# Patient Record
Sex: MALE | URBAN - NONMETROPOLITAN AREA
[De-identification: names, ages, dates, MRNs, and addresses within clinical notes are randomized per-mention and may not be internally consistent; named-entity substitution may affect disease eponyms.]

---

## 2019-01-10 ENCOUNTER — OFFICE VISIT (OUTPATIENT)
Dept: NEUROLOGY | Age: 59
End: 2019-01-10
Payer: MEDICAID

## 2019-01-10 VITALS
HEIGHT: 68 IN | SYSTOLIC BLOOD PRESSURE: 113 MMHG | WEIGHT: 131 LBS | DIASTOLIC BLOOD PRESSURE: 77 MMHG | HEART RATE: 128 BPM | BODY MASS INDEX: 19.85 KG/M2

## 2019-01-10 DIAGNOSIS — F10.10 ALCOHOL ABUSE: ICD-10-CM

## 2019-01-10 DIAGNOSIS — G25.0 ESSENTIAL TREMOR: Primary | ICD-10-CM

## 2019-01-10 DIAGNOSIS — Z86.59 HISTORY OF ANXIETY: ICD-10-CM

## 2019-01-10 PROCEDURE — 99204 OFFICE O/P NEW MOD 45 MIN: CPT | Performed by: PSYCHIATRY & NEUROLOGY

## 2019-01-10 RX ORDER — BUSPIRONE HYDROCHLORIDE 10 MG/1
TABLET ORAL
Refills: 0 | COMMUNITY
Start: 2018-11-09

## 2019-01-10 RX ORDER — DULOXETIN HYDROCHLORIDE 60 MG/1
CAPSULE, DELAYED RELEASE ORAL
Refills: 1 | COMMUNITY
Start: 2018-12-31

## 2019-01-10 RX ORDER — TRAZODONE HYDROCHLORIDE 100 MG/1
TABLET ORAL
Refills: 5 | COMMUNITY
Start: 2018-11-21

## 2019-01-10 RX ORDER — LEVOFLOXACIN 500 MG/1
TABLET, FILM COATED ORAL
Refills: 0 | COMMUNITY
Start: 2018-11-09

## 2019-01-10 RX ORDER — ARIPIPRAZOLE 5 MG/1
TABLET ORAL
Refills: 2 | COMMUNITY
Start: 2018-11-17

## 2019-01-10 RX ORDER — ERGOCALCIFEROL 1.25 MG/1
CAPSULE ORAL
Refills: 1 | COMMUNITY
Start: 2018-12-12

## 2019-01-10 RX ORDER — RISPERIDONE 0.5 MG/1
TABLET, FILM COATED ORAL
Refills: 2 | COMMUNITY
Start: 2018-12-17

## 2019-01-10 RX ORDER — ONDANSETRON 4 MG/1
TABLET, FILM COATED ORAL
Refills: 0 | COMMUNITY
Start: 2018-11-09

## 2024-06-06 ENCOUNTER — OFFICE VISIT (OUTPATIENT)
Dept: VASCULAR SURGERY | Age: 64
End: 2024-06-06
Payer: MEDICARE

## 2024-06-06 ENCOUNTER — TELEPHONE (OUTPATIENT)
Dept: VASCULAR SURGERY | Age: 64
End: 2024-06-06

## 2024-06-06 VITALS
SYSTOLIC BLOOD PRESSURE: 147 MMHG | WEIGHT: 120 LBS | HEIGHT: 69 IN | DIASTOLIC BLOOD PRESSURE: 86 MMHG | HEART RATE: 85 BPM | TEMPERATURE: 97.3 F | BODY MASS INDEX: 17.77 KG/M2 | OXYGEN SATURATION: 85 %

## 2024-06-06 DIAGNOSIS — I70.213 ATHEROSCLER OF NATIVE ARTERY OF BOTH LEGS WITH INTERMIT CLAUDICATION (HCC): Primary | ICD-10-CM

## 2024-06-06 PROCEDURE — G8428 CUR MEDS NOT DOCUMENT: HCPCS | Performed by: NURSE PRACTITIONER

## 2024-06-06 PROCEDURE — G8419 CALC BMI OUT NRM PARAM NOF/U: HCPCS | Performed by: NURSE PRACTITIONER

## 2024-06-06 PROCEDURE — 4004F PT TOBACCO SCREEN RCVD TLK: CPT | Performed by: NURSE PRACTITIONER

## 2024-06-06 PROCEDURE — 3017F COLORECTAL CA SCREEN DOC REV: CPT | Performed by: NURSE PRACTITIONER

## 2024-06-06 PROCEDURE — 99203 OFFICE O/P NEW LOW 30 MIN: CPT | Performed by: NURSE PRACTITIONER

## 2024-06-06 NOTE — TELEPHONE ENCOUNTER
Called Dr. Borjas's office and left vm for patients med list to be faxed to us or for a return call to get that information.

## 2024-06-06 NOTE — PROGRESS NOTES
Mulugeta lBack (:  1960) is a 63 y.o. male,New patient, here for evaluation of the following chief complaint(s):  New Patient (PVD)            SUBJECTIVE/OBJECTIVE:  Mulugeta has a history of peripheral vascular disease of the lower extremities.  He has had this for < 1 year. Current treatment includes none.  Mulugeta has not had new wounds. Recently, he reports claudication at a distance of  200 feet.  Mulugeta reports that the left leg is more significant than the right.  He reports claudication is worsened and is mostly in the form of crampy type pain starting in the calves. He has a short recovery time. This is reproducible in nature. He reports ischemic rest pain 0 times per night.  He reports walking with cart does not help.    Has bilateral lower extremity edema.  Does not wear support hose.  Has not had hx of dvt    I have personally reviewed the following: problem list, current meds, allergies, PMH, PSH, family hx, and social hx  Mulugeta Black is a 63 y.o. male with the following history as recorded in [facility]:  There are no problems to display for this patient.    Current Outpatient Medications   Medication Sig Dispense Refill    ARIPiprazole (ABILIFY) 5 MG tablet TAKE 1 TABLET BY MOUTH EVERY DAY  2    busPIRone (BUSPAR) 10 MG tablet TAKE 1 TABLET BY MOUTH 3 TIMES A DAY  0    DULoxetine (CYMBALTA) 60 MG extended release capsule TAKE 1 CAPSULE BY MOUTH EVERY DAY  1    vitamin D (ERGOCALCIFEROL) 00742 units CAPS capsule TAKE ONE CAPSULE BY MOUTH WEEKLY  1    levofloxacin (LEVAQUIN) 500 MG tablet TAKE 1 TABLET BY MOUTH EVERY DAY  0    ondansetron (ZOFRAN) 4 MG tablet 1 TABLET BY MOUTH EVERY 6 HOURS AS NEEDED  0    traZODone (DESYREL) 100 MG tablet TAKE 1/2 TO 1 TABLET BY MOUTH AT BEDTIME  5    risperiDONE (RISPERDAL) 0.5 MG tablet TAKE 1 TABLET BY MOUTH TWICE A DAY  2     No current facility-administered medications for this visit.     Allergies: Patient has no known allergies.  Past Medical History:

## 2024-06-10 RX ORDER — ASPIRIN 81 MG/1
81 TABLET ORAL ONCE
OUTPATIENT
Start: 2024-06-10 | End: 2024-06-10

## 2024-06-10 RX ORDER — SODIUM CHLORIDE 0.9 % (FLUSH) 0.9 %
5-40 SYRINGE (ML) INJECTION EVERY 12 HOURS SCHEDULED
OUTPATIENT
Start: 2024-06-10

## 2024-06-10 RX ORDER — SODIUM CHLORIDE 9 MG/ML
INJECTION, SOLUTION INTRAVENOUS CONTINUOUS
OUTPATIENT
Start: 2024-06-10

## 2024-06-10 RX ORDER — CLONIDINE HYDROCHLORIDE 0.1 MG/1
0.1 TABLET ORAL PRN
OUTPATIENT
Start: 2024-06-10

## 2024-06-10 RX ORDER — SODIUM CHLORIDE 9 MG/ML
INJECTION, SOLUTION INTRAVENOUS PRN
OUTPATIENT
Start: 2024-06-10

## 2024-06-10 RX ORDER — SODIUM CHLORIDE 0.9 % (FLUSH) 0.9 %
5-40 SYRINGE (ML) INJECTION PRN
OUTPATIENT
Start: 2024-06-10

## 2024-06-10 NOTE — H&P
Mulugeta Black (:  1960) is a 63 y.o. male,New patient, here for evaluation of the following chief complaint(s):  New Patient (PVD)            SUBJECTIVE/OBJECTIVE:  Mulugeta has a history of peripheral vascular disease of the lower extremities.  He has had this for < 1 year. Current treatment includes none.  Mulugeta has not had new wounds. Recently, he reports claudication at a distance of  200 feet.  Mulugeta reports that the left leg is more significant than the right.  He reports claudication is worsened and is mostly in the form of crampy type pain starting in the calves. He has a short recovery time. This is reproducible in nature. He reports ischemic rest pain 0 times per night.  He reports walking with cart does not help.    Has bilateral lower extremity edema.  Does not wear support hose.  Has not had hx of dvt    I have personally reviewed the following: problem list, current meds, allergies, PMH, PSH, family hx, and social hx  Mulugeta Black is a 63 y.o. male with the following history as recorded in [facility]:  There are no problems to display for this patient.    Current Outpatient Medications   Medication Sig Dispense Refill    ARIPiprazole (ABILIFY) 5 MG tablet TAKE 1 TABLET BY MOUTH EVERY DAY  2    busPIRone (BUSPAR) 10 MG tablet TAKE 1 TABLET BY MOUTH 3 TIMES A DAY  0    DULoxetine (CYMBALTA) 60 MG extended release capsule TAKE 1 CAPSULE BY MOUTH EVERY DAY  1    vitamin D (ERGOCALCIFEROL) 12887 units CAPS capsule TAKE ONE CAPSULE BY MOUTH WEEKLY  1    levofloxacin (LEVAQUIN) 500 MG tablet TAKE 1 TABLET BY MOUTH EVERY DAY  0    ondansetron (ZOFRAN) 4 MG tablet 1 TABLET BY MOUTH EVERY 6 HOURS AS NEEDED  0    traZODone (DESYREL) 100 MG tablet TAKE 1/2 TO 1 TABLET BY MOUTH AT BEDTIME  5    risperiDONE (RISPERDAL) 0.5 MG tablet TAKE 1 TABLET BY MOUTH TWICE A DAY  2     No current facility-administered medications for this visit.     Allergies: Patient has no known allergies.  Past Medical History:

## 2024-06-12 ENCOUNTER — TELEPHONE (OUTPATIENT)
Dept: VASCULAR SURGERY | Age: 64
End: 2024-06-12

## 2024-06-12 NOTE — TELEPHONE ENCOUNTER
I called and spoke with the patient in regards to surgery scheduling and instructions below. Patient verbalized understanding. The patient is aware to be NPO 14 hours prior to surgery due to fasting lipid labs on arrival.       Mulugeta Black     Arteriogram Instructions     Report to the [facility] at [facility] (go in the front door and to the left) on Thursday, 06/11/2024 at 7:30 AM.  Nothing to eat or drink 14 hours prior to the procedure.  Please take all medications as normally scheduled to take unless listed below with a sip of water  If you have sleep apnea and require C-PAP, please bring this with you to the hospital.  Bring a list of all of your allergies and medications with you to the hospital.  Please let our nurse know if you have had an allergy to iodine, shellfish, or x-ray dye.  Let the nurse know if you take any of the following:  Over the counter herbal supplements  Diclofenec, indomethacin, ketoprofen, Caridopa/levadopa, naproxen, sulindac, piroxicam, glucosamine, Chondrotin, cocchine, or methotrexate.  Plan to stay at the hospital for 4 - 6 hours before being released  by the physician. You will need someone to drive you home after the procedure.  Please stop at your local [employer] or pharmacy and buy a bottle of Hibiclens. Wash thoroughly with this the night before and the morning of the procedure, paying special attention to the area that will be operated on.  Make sure you rinse very well. The Hibiclens should only be used prior to surgery.  14. Other Directions: For any questions or concerns, feel free to call [phone] ext [phone] and ask to speak to Monica.   15.  You will need a  to take you home  16.  Follow up appt: 07/25/2024 @ 9:45 AM with Olga.     Unless instructed otherwise by your physician, cleanse incision/puncture site twice daily with soap and water.  Apply dry gauze. Do not get in tub.  Okay to shower.  Do not apply any salve, cream, peroxide or

## 2024-07-01 ENCOUNTER — OFFICE VISIT (OUTPATIENT)
Dept: CARDIOLOGY CLINIC | Age: 64
End: 2024-07-01
Payer: MEDICARE

## 2024-07-01 VITALS
WEIGHT: 122 LBS | SYSTOLIC BLOOD PRESSURE: 118 MMHG | OXYGEN SATURATION: 98 % | HEART RATE: 71 BPM | HEIGHT: 69 IN | BODY MASS INDEX: 18.07 KG/M2 | DIASTOLIC BLOOD PRESSURE: 74 MMHG

## 2024-07-01 DIAGNOSIS — R60.0 BILATERAL EDEMA OF LOWER EXTREMITY: ICD-10-CM

## 2024-07-01 DIAGNOSIS — R94.31 ABNORMAL EKG: ICD-10-CM

## 2024-07-01 DIAGNOSIS — Z82.49 FAMILY HISTORY OF EARLY CAD: ICD-10-CM

## 2024-07-01 DIAGNOSIS — R06.02 SHORTNESS OF BREATH: Primary | ICD-10-CM

## 2024-07-01 DIAGNOSIS — R06.02 SHORTNESS OF BREATH: ICD-10-CM

## 2024-07-01 LAB
ANION GAP SERPL CALCULATED.3IONS-SCNC: 17 MMOL/L (ref 7–19)
BNP BLD-MCNC: 180 PG/ML (ref 0–124)
BUN SERPL-MCNC: 7 MG/DL (ref 8–23)
CALCIUM SERPL-MCNC: 9.2 MG/DL (ref 8.8–10.2)
CHLORIDE SERPL-SCNC: 99 MMOL/L (ref 98–111)
CO2 SERPL-SCNC: 22 MMOL/L (ref 22–29)
CREAT SERPL-MCNC: 0.8 MG/DL (ref 0.5–1.2)
GLUCOSE SERPL-MCNC: 93 MG/DL (ref 74–109)
POTASSIUM SERPL-SCNC: 4.1 MMOL/L (ref 3.5–5)
SODIUM SERPL-SCNC: 138 MMOL/L (ref 136–145)

## 2024-07-01 PROCEDURE — 93000 ELECTROCARDIOGRAM COMPLETE: CPT | Performed by: NURSE PRACTITIONER

## 2024-07-01 PROCEDURE — 3017F COLORECTAL CA SCREEN DOC REV: CPT | Performed by: NURSE PRACTITIONER

## 2024-07-01 PROCEDURE — 99204 OFFICE O/P NEW MOD 45 MIN: CPT | Performed by: NURSE PRACTITIONER

## 2024-07-01 PROCEDURE — 4004F PT TOBACCO SCREEN RCVD TLK: CPT | Performed by: NURSE PRACTITIONER

## 2024-07-01 PROCEDURE — G8427 DOCREV CUR MEDS BY ELIG CLIN: HCPCS | Performed by: NURSE PRACTITIONER

## 2024-07-01 PROCEDURE — G8419 CALC BMI OUT NRM PARAM NOF/U: HCPCS | Performed by: NURSE PRACTITIONER

## 2024-07-01 RX ORDER — UREA 10 %
500 LOTION (ML) TOPICAL DAILY
COMMUNITY

## 2024-07-01 RX ORDER — VITAMIN E 268 MG
400 CAPSULE ORAL DAILY
COMMUNITY

## 2024-07-01 RX ORDER — FUROSEMIDE 20 MG/1
20 TABLET ORAL DAILY PRN
Qty: 30 TABLET | Refills: 5 | Status: SHIPPED | OUTPATIENT
Start: 2024-07-01

## 2024-07-01 RX ORDER — THIAMINE MONONITRATE (VIT B1) 100 MG
100 TABLET ORAL DAILY
COMMUNITY

## 2024-07-01 ASSESSMENT — ENCOUNTER SYMPTOMS
CHEST TIGHTNESS: 0
WHEEZING: 0
COUGH: 0
SORE THROAT: 0
SHORTNESS OF BREATH: 1

## 2024-07-01 NOTE — PROGRESS NOTES
[facility]  [address]  [address]  [facility] [address]  [phone]      Chief Complaint / Reason for Being Seen: Abnormal echo    1. Shortness of breath    2. Abnormal EKG    3. Bilateral edema of lower extremity      Patient with a family history of coronary artery disease which includes a mother and father both having  of massive heart attacks.  Brother had a heart attack as well.  He is a current smoker of 50 years 1 and half pack per day.  He has seen Dr. Us in the past for COPD.  Patient with a history of peripheral vascular disease followed by vascular clinic.    Patient presents to clinic today accompanied by brother.  He does have some complaints of occasional shortness of breath he believes related to his cigarette smoking.  He denies any chest pain.  He is anticipating having vascular surgery in middle of July.  He was referred to our office by primary care provider for an abnormal 2D echo showing:     Patient had a 2D echo on 5/3/2024 at [facility] on that showed EF 55%.  Normal diastolic function.  RV size is normal.  RV systolic function normal.  Left atrium normal.  Right atrium normal.  Aortic valve sclerosis with adequate leaflet excursion with no stenosis.  Mitral valve mild mitral regurg.  Trace tricuspid regurg.  Trivial pulmonic regurg.  No pericardial effusion present.      Did discuss with patient and brother EF normal.  Normal diastolic function.  Did have some mild mitral regurg and trace tricuspid regurg trivial pulmonic regurg.  No significant valvular heart disease.        Subjective:    Old records have been obtained from the referring providers.  Those records have been reviewed and summarized.      Mulugeta Black is a 63 y.o. male with the following history as recorded in [employer]:  There are no problems to display for this patient.    Current Outpatient Medications   Medication Sig Dispense Refill    vitamin B-1 (THIAMINE) 100 MG tablet Take 1

## 2024-07-01 NOTE — PATIENT INSTRUCTIONS
[place] at the [facility] located on the first floor of [facility].   Enter through hospital main entrance and turn immediately to your left.    Patient's contact number:  [phone] (home)     Date/Time:     Dobutamine Stress Test    A chemical stress test uses chemical agents injected into the body through the vein.  These chemicals make the heart function as if it were under stress.  A chemical stress test is used when a traditional stress test (called a cardiac stress test) cannot be done.  Testing will take approximately one hour.      Dobutamine Stress Echocardiogram Instructions:   No caffeine 24 hours prior to the testing.  This includes: coffee, pop/soda, chocolate, cold medications, etc.  Any product that might contain caffeine.    Do not eat or drink anything, except water, eight (8) hours before the test.   No alcohol or nicotine twelve (12) hours prior to your test.   Wear comfortable clothing.  If you are taking metoprolol, stop morning of this procedure.     If you need to change this appointment, please call outpatient scheduling at [phone].

## 2024-07-09 ENCOUNTER — HOSPITAL ENCOUNTER (OUTPATIENT)
Age: 64
Discharge: HOME | End: 2024-07-11
Payer: MEDICARE

## 2024-07-09 VITALS
SYSTOLIC BLOOD PRESSURE: 139 MMHG | BODY MASS INDEX: 17.92 KG/M2 | DIASTOLIC BLOOD PRESSURE: 71 MMHG | WEIGHT: 121 LBS | HEIGHT: 69 IN

## 2024-07-09 DIAGNOSIS — R94.31 ABNORMAL EKG: ICD-10-CM

## 2024-07-09 LAB
ECHO BSA: 1.63 M2
STRESS BASELINE DIAS BP: 82 MMHG
STRESS BASELINE HR: 77 BPM
STRESS BASELINE SYS BP: 140 MMHG
STRESS EXERCISE DUR MIN: 7 MIN
STRESS EXERCISE DUR SEC: 54 SEC
STRESS PEAK DIAS BP: 69 MMHG
STRESS PEAK SYS BP: 157 MMHG
STRESS PERCENT HR ACHIEVED: 88 %
STRESS POST PEAK HR: 139 BPM
STRESS RATE PRESSURE PRODUCT: NORMAL BPM*MMHG
STRESS STAGE 1 BP: NORMAL MMHG
STRESS STAGE 1 DURATION: 2 MIN:SEC
STRESS STAGE 1 HR: 75 BPM
STRESS STAGE 2 BP: NORMAL MMHG
STRESS STAGE 2 DURATION: 2 MIN:SEC
STRESS STAGE 2 HR: 88 BPM
STRESS STAGE 3 BP: NORMAL MMHG
STRESS STAGE 3 DURATION: 2 MIN:SEC
STRESS STAGE 3 HR: 112 BPM
STRESS STAGE 4 BP: NORMAL MMHG
STRESS STAGE 4 DURATION: NORMAL MIN:SEC
STRESS STAGE 4 HR: 136 BPM
STRESS STAGE RECOVERY 1 DURATION: NORMAL MIN:SEC
STRESS TARGET HR: 158 BPM

## 2024-07-09 PROCEDURE — 6360000004 HC RX CONTRAST MEDICATION: Performed by: NURSE PRACTITIONER

## 2024-07-09 PROCEDURE — 93017 CV STRESS TEST TRACING ONLY: CPT

## 2024-07-09 PROCEDURE — 2500000003 HC RX 250 WO HCPCS: Performed by: NURSE PRACTITIONER

## 2024-07-09 PROCEDURE — 93016 CV STRESS TEST SUPVJ ONLY: CPT | Performed by: INTERNAL MEDICINE

## 2024-07-09 PROCEDURE — 93352 ADMIN ECG CONTRAST AGENT: CPT | Performed by: INTERNAL MEDICINE

## 2024-07-09 PROCEDURE — 93018 CV STRESS TEST I&R ONLY: CPT | Performed by: INTERNAL MEDICINE

## 2024-07-09 PROCEDURE — 93350 STRESS TTE ONLY: CPT | Performed by: INTERNAL MEDICINE

## 2024-07-09 PROCEDURE — 6360000002 HC RX W HCPCS: Performed by: NURSE PRACTITIONER

## 2024-07-09 RX ORDER — SODIUM CHLORIDE 9 MG/ML
INJECTION, SOLUTION INTRAVENOUS
Status: ACTIVE | OUTPATIENT
Start: 2024-07-09 | End: 2024-07-10

## 2024-07-09 RX ORDER — DOBUTAMINE HYDROCHLORIDE 200 MG/100ML
10 INJECTION INTRAVENOUS CONTINUOUS PRN
Status: DISPENSED | OUTPATIENT
Start: 2024-07-09 | End: 2024-07-10

## 2024-07-09 RX ORDER — ATROPINE SULFATE 0.1 MG/ML
1 INJECTION INTRAVENOUS PRN
Status: ACTIVE | OUTPATIENT
Start: 2024-07-09 | End: 2024-07-10

## 2024-07-09 RX ORDER — METOPROLOL TARTRATE 1 MG/ML
5 INJECTION, SOLUTION INTRAVENOUS PRN
Status: DISPENSED | OUTPATIENT
Start: 2024-07-09 | End: 2024-07-10

## 2024-07-09 RX ADMIN — METOPROLOL TARTRATE 5 MG: 5 INJECTION INTRAVENOUS at 11:40

## 2024-07-09 RX ADMIN — DOBUTAMINE HYDROCHLORIDE 10 MCG/KG/MIN: 200 INJECTION INTRAVENOUS at 11:32

## 2024-07-11 ENCOUNTER — HOSPITAL ENCOUNTER (OUTPATIENT)
Dept: INTERVENTIONAL RADIOLOGY/VASCULAR | Age: 64
Discharge: HOME | End: 2024-07-11
Payer: MEDICARE

## 2024-07-11 VITALS — SYSTOLIC BLOOD PRESSURE: 128 MMHG | HEART RATE: 84 BPM | DIASTOLIC BLOOD PRESSURE: 71 MMHG | OXYGEN SATURATION: 99 %

## 2024-07-11 DIAGNOSIS — I70.213 ATHEROSCLEROSIS OF NATIVE ARTERIES OF EXTREMITIES WITH INTERMITTENT CLAUDICATION, BILATERAL LEGS (HCC): ICD-10-CM

## 2024-07-11 LAB
ANION GAP SERPL CALCULATED.3IONS-SCNC: 13 MMOL/L (ref 7–19)
BUN SERPL-MCNC: 5 MG/DL (ref 8–23)
CALCIUM SERPL-MCNC: 9.4 MG/DL (ref 8.8–10.2)
CHLORIDE SERPL-SCNC: 102 MMOL/L (ref 98–111)
CHOLEST SERPL-MCNC: 118 MG/DL (ref 160–199)
CO2 SERPL-SCNC: 24 MMOL/L (ref 22–29)
CREAT SERPL-MCNC: 0.7 MG/DL (ref 0.5–1.2)
ERYTHROCYTE [DISTWIDTH] IN BLOOD BY AUTOMATED COUNT: 12.3 % (ref 11.5–14.5)
GLUCOSE SERPL-MCNC: 135 MG/DL (ref 74–109)
HCT VFR BLD AUTO: 45 % (ref 42–52)
HDLC SERPL-MCNC: 67 MG/DL (ref 55–121)
HGB BLD-MCNC: 15.1 G/DL (ref 14–18)
LDLC SERPL CALC-MCNC: 41 MG/DL
MCH RBC QN AUTO: 35 PG (ref 27–31)
MCHC RBC AUTO-ENTMCNC: 33.6 G/DL (ref 33–37)
MCV RBC AUTO: 104.2 FL (ref 80–94)
PLATELET # BLD AUTO: 221 K/UL (ref 130–400)
PMV BLD AUTO: 10.6 FL (ref 9.4–12.4)
POTASSIUM SERPL-SCNC: 3.8 MMOL/L (ref 3.5–5)
RBC # BLD AUTO: 4.32 M/UL (ref 4.7–6.1)
SODIUM SERPL-SCNC: 139 MMOL/L (ref 136–145)
TRIGL SERPL-MCNC: 49 MG/DL (ref 0–149)
WBC # BLD AUTO: 5.6 K/UL (ref 4.8–10.8)

## 2024-07-11 PROCEDURE — 36415 COLL VENOUS BLD VENIPUNCTURE: CPT

## 2024-07-11 PROCEDURE — 80048 BASIC METABOLIC PNL TOTAL CA: CPT

## 2024-07-11 PROCEDURE — 85027 COMPLETE CBC AUTOMATED: CPT

## 2024-07-11 PROCEDURE — 80061 LIPID PANEL: CPT

## 2024-07-11 RX ORDER — SODIUM CHLORIDE 0.9 % (FLUSH) 0.9 %
5-40 SYRINGE (ML) INJECTION EVERY 12 HOURS SCHEDULED
Status: DISCONTINUED | OUTPATIENT
Start: 2024-07-11 | End: 2024-07-13 | Stop reason: HOSPADM

## 2024-07-11 RX ORDER — SODIUM CHLORIDE 0.9 % (FLUSH) 0.9 %
5-40 SYRINGE (ML) INJECTION PRN
Status: DISCONTINUED | OUTPATIENT
Start: 2024-07-11 | End: 2024-07-13 | Stop reason: HOSPADM

## 2024-07-11 RX ORDER — SODIUM CHLORIDE 9 MG/ML
INJECTION, SOLUTION INTRAVENOUS PRN
Status: DISCONTINUED | OUTPATIENT
Start: 2024-07-11 | End: 2024-07-13 | Stop reason: HOSPADM

## 2024-07-11 RX ORDER — CLONIDINE HYDROCHLORIDE 0.1 MG/1
0.1 TABLET ORAL PRN
Status: DISCONTINUED | OUTPATIENT
Start: 2024-07-11 | End: 2024-07-13 | Stop reason: HOSPADM

## 2024-07-11 RX ORDER — ASPIRIN 81 MG/1
81 TABLET ORAL ONCE
Status: DISCONTINUED | OUTPATIENT
Start: 2024-07-11 | End: 2024-07-13 | Stop reason: HOSPADM

## 2024-07-11 RX ORDER — SODIUM CHLORIDE 9 MG/ML
INJECTION, SOLUTION INTRAVENOUS CONTINUOUS
Status: DISCONTINUED | OUTPATIENT
Start: 2024-07-11 | End: 2024-07-13 | Stop reason: HOSPADM

## 2024-07-11 NOTE — PROGRESS NOTES
Pt stated that he ate cheese this am and had a \"swig of soda\" at 8am today. Dr Latif notified, per Dr Latif procedure is to be rescheduled. Pt instructed that office will be in touch with him with new procedure date. Electronically signed by Jeannie Crabtree RN on 7/11/2024 at 10:09 AM

## 2024-07-15 ENCOUNTER — TELEPHONE (OUTPATIENT)
Dept: VASCULAR SURGERY | Age: 64
End: 2024-07-15

## 2024-07-15 NOTE — TELEPHONE ENCOUNTER
I called and spoke with the patient in regards surgery scheduling. The patient needed his surgery rescheduled from prior cancellation. I have provided the patient with a new surgery date. The patient is aware of his new surgery time and instructions. The patient verbalized understanding.

## 2024-07-18 ENCOUNTER — OFFICE VISIT (OUTPATIENT)
Dept: CARDIOLOGY CLINIC | Age: 64
End: 2024-07-18
Payer: MEDICARE

## 2024-07-18 ENCOUNTER — OFFICE VISIT (OUTPATIENT)
Dept: VASCULAR SURGERY | Age: 64
End: 2024-07-18
Payer: MEDICARE

## 2024-07-18 VITALS
DIASTOLIC BLOOD PRESSURE: 78 MMHG | SYSTOLIC BLOOD PRESSURE: 122 MMHG | HEART RATE: 82 BPM | WEIGHT: 122 LBS | BODY MASS INDEX: 18.02 KG/M2 | OXYGEN SATURATION: 100 %

## 2024-07-18 VITALS
TEMPERATURE: 97.9 F | OXYGEN SATURATION: 99 % | SYSTOLIC BLOOD PRESSURE: 150 MMHG | HEART RATE: 81 BPM | DIASTOLIC BLOOD PRESSURE: 73 MMHG

## 2024-07-18 DIAGNOSIS — I70.213 ATHEROSCLEROSIS OF NATIVE ARTERY OF BOTH LOWER EXTREMITIES WITH INTERMITTENT CLAUDICATION (HCC): Primary | ICD-10-CM

## 2024-07-18 DIAGNOSIS — Z82.49 FAMILY HISTORY OF EARLY CAD: ICD-10-CM

## 2024-07-18 DIAGNOSIS — I70.244 ATHEROSCLEROSIS OF NATIVE ARTERY OF LEFT LOWER EXTREMITY WITH ULCERATION OF HEEL (HCC): ICD-10-CM

## 2024-07-18 DIAGNOSIS — E78.5 DYSLIPIDEMIA: Primary | ICD-10-CM

## 2024-07-18 PROCEDURE — 99214 OFFICE O/P EST MOD 30 MIN: CPT | Performed by: NURSE PRACTITIONER

## 2024-07-18 PROCEDURE — G8419 CALC BMI OUT NRM PARAM NOF/U: HCPCS | Performed by: NURSE PRACTITIONER

## 2024-07-18 PROCEDURE — 3017F COLORECTAL CA SCREEN DOC REV: CPT | Performed by: NURSE PRACTITIONER

## 2024-07-18 PROCEDURE — 4004F PT TOBACCO SCREEN RCVD TLK: CPT | Performed by: NURSE PRACTITIONER

## 2024-07-18 PROCEDURE — G8427 DOCREV CUR MEDS BY ELIG CLIN: HCPCS | Performed by: NURSE PRACTITIONER

## 2024-07-18 RX ORDER — MUPIROCIN CALCIUM 20 MG/G
CREAM TOPICAL
Qty: 1 EACH | Refills: 0 | Status: SHIPPED | OUTPATIENT
Start: 2024-07-18 | End: 2024-08-17

## 2024-07-18 RX ORDER — SODIUM CHLORIDE 0.9 % (FLUSH) 0.9 %
5-40 SYRINGE (ML) INJECTION EVERY 12 HOURS SCHEDULED
Status: CANCELLED | OUTPATIENT
Start: 2024-07-18

## 2024-07-18 RX ORDER — ASPIRIN 81 MG/1
81 TABLET ORAL ONCE
Status: CANCELLED | OUTPATIENT
Start: 2024-07-18 | End: 2024-07-18

## 2024-07-18 RX ORDER — ATORVASTATIN CALCIUM 20 MG/1
TABLET, FILM COATED ORAL
COMMUNITY
Start: 2024-07-01

## 2024-07-18 RX ORDER — SODIUM CHLORIDE 9 MG/ML
INJECTION, SOLUTION INTRAVENOUS PRN
Status: CANCELLED | OUTPATIENT
Start: 2024-07-18

## 2024-07-18 RX ORDER — CLONIDINE HYDROCHLORIDE 0.1 MG/1
0.1 TABLET ORAL PRN
Status: CANCELLED | OUTPATIENT
Start: 2024-07-18

## 2024-07-18 RX ORDER — SODIUM CHLORIDE 0.9 % (FLUSH) 0.9 %
5-40 SYRINGE (ML) INJECTION PRN
Status: CANCELLED | OUTPATIENT
Start: 2024-07-18

## 2024-07-18 RX ORDER — HYDROGEN PEROXIDE 2.65 ML/100ML
81 LIQUID ORAL; TOPICAL DAILY
COMMUNITY
Start: 2024-07-01

## 2024-07-18 RX ORDER — SODIUM CHLORIDE 9 MG/ML
INJECTION, SOLUTION INTRAVENOUS CONTINUOUS
Status: CANCELLED | OUTPATIENT
Start: 2024-07-18

## 2024-07-18 ASSESSMENT — ENCOUNTER SYMPTOMS
CHEST TIGHTNESS: 0
WHEEZING: 0
SHORTNESS OF BREATH: 1
SORE THROAT: 0
COUGH: 0

## 2024-07-18 NOTE — H&P (VIEW-ONLY)
Mulugeta Black (:  1960) is a 63 y.o. male,New patient, here for evaluation of the following chief complaint(s):  established Patient (PVD)            SUBJECTIVE/OBJECTIVE:  Mulugeta has a history of peripheral vascular disease of the lower extremities.  He has had this for < 1 year. Current treatment includes none.  Mulugeta has not had new wounds. Recently, he reports claudication at a distance of  200 feet.  Mulugeta reports that the left leg is more significant than the right.  He reports claudication is worsened and is mostly in the form of crampy type pain starting in the calves. He has a short recovery time. This is reproducible in nature. He reports ischemic rest pain 0 times per night.  He reports walking with cart does not help. He came in today because of new wound.  Started in the last week    Has bilateral lower extremity edema.  Does not wear support hose.  Has not had hx of dvt    I have personally reviewed the following: problem list, current meds, allergies, PMH, PSH, family hx, and social hx  Mulugeta Black is a 63 y.o. male with the following history as recorded in [facility]:  Patient Active Problem List    Diagnosis Date Noted    Hyperlipidemia 2024    Atherosclerosis of native artery of both lower extremities with intermittent claudication (HCC) 2024     Current Outpatient Medications   Medication Sig Dispense Refill    atorvastatin (LIPITOR) 20 MG tablet TAKE 1 TABLET BY MOUTH ONCE DAILY WITH SUPPER FOR CHOLESTEROL      EQ ASPIRIN ADULT LOW DOSE 81 MG EC tablet Take 1 tablet by mouth daily      mupirocin (BACTROBAN) 2 % cream Apply topically 3 times daily. 1 each 0    vitamin B-1 (THIAMINE) 100 MG tablet Take 1 tablet by mouth daily      vitamin B-12 (CYANOCOBALAMIN) 500 MCG tablet Take 1 tablet by mouth daily      vitamin E 400 UNIT capsule Take 1 capsule by mouth daily      furosemide (LASIX) 20 MG tablet Take 1 tablet by mouth daily as needed (ankle swelling) 30 tablet 5     No

## 2024-07-18 NOTE — H&P
Mulugeta Black (:  1960) is a 63 y.o. male,New patient, here for evaluation of the following chief complaint(s):  established Patient (PVD)            SUBJECTIVE/OBJECTIVE:  Mulugeta has a history of peripheral vascular disease of the lower extremities.  He has had this for < 1 year. Current treatment includes none.  Mulugeta has not had new wounds. Recently, he reports claudication at a distance of  200 feet.  Mulugeta reports that the left leg is more significant than the right.  He reports claudication is worsened and is mostly in the form of crampy type pain starting in the calves. He has a short recovery time. This is reproducible in nature. He reports ischemic rest pain 0 times per night.  He reports walking with cart does not help. He came in today because of new wound.  Started in the last week    Has bilateral lower extremity edema.  Does not wear support hose.  Has not had hx of dvt    I have personally reviewed the following: problem list, current meds, allergies, PMH, PSH, family hx, and social hx  Mulugeta Black is a 63 y.o. male with the following history as recorded in [facility]:  Patient Active Problem List    Diagnosis Date Noted    Hyperlipidemia 2024    Atherosclerosis of native artery of both lower extremities with intermittent claudication (HCC) 2024     Current Outpatient Medications   Medication Sig Dispense Refill    atorvastatin (LIPITOR) 20 MG tablet TAKE 1 TABLET BY MOUTH ONCE DAILY WITH SUPPER FOR CHOLESTEROL      EQ ASPIRIN ADULT LOW DOSE 81 MG EC tablet Take 1 tablet by mouth daily      mupirocin (BACTROBAN) 2 % cream Apply topically 3 times daily. 1 each 0    vitamin B-1 (THIAMINE) 100 MG tablet Take 1 tablet by mouth daily      vitamin B-12 (CYANOCOBALAMIN) 500 MCG tablet Take 1 tablet by mouth daily      vitamin E 400 UNIT capsule Take 1 capsule by mouth daily      furosemide (LASIX) 20 MG tablet Take 1 tablet by mouth daily as needed (ankle swelling) 30 tablet 5     No

## 2024-07-18 NOTE — PROGRESS NOTES
dizziness, syncope, light-headedness and headaches.   Psychiatric/Behavioral:  Negative for confusion. The patient is not nervous/anxious.         Objective:    /78   Pulse 82   Wt 55.3 kg (122 lb)   SpO2 100%   BMI 18.02 kg/m²     GENERAL - well developed and well nourished, conversant  HEENT -  PERRLA, Hearing appears normal, gentleman lids are normal.  External inspection of ears and nose appear normal.  NECK - no thyromegaly, no JVD, trachea is in the midline  CARDIOVASCULAR - PMI is in the mid line clavicular position, Normal S1 and S2 with no systolic murmur.  No S3 or S4    PULMONARY - no respiratory distress.  No wheezes or rales. Lungs are clear to ausculation, normal respiratory effort.  ABDOMEN  - soft, non tender, no rebound  MUSCULOSKELETAL  - range of motion of the upper and lower extermites appears normal and equal and is without pain   EXTREMITIES - no significant edema   NEUROLOGIC - gait and station are normal  SKIN - turgor is normal, can warm and dry.  PSYCHIATRIC - normal mood and affect, alert and orientated x 3,      ASSESSMENT:    ALL THE CARDIOLOGY PROBLEMS ARE LISTED ABOVE; HOWEVER, THE FOLLOWING SPECIFIC CARDIAC PROBLEMS / CONDITIONS WERE ADDRESSED AND TREATED DURING THE OFFICE VISIT TODAY:                                                                                            MEDICAL DECISION MAKING             Cardiac Specific Problem / Diagnosis  Discussion and Data Reviewed Diagnostic Procedures Ordered Management Options Selected           1. Family history of early CAD  Stable   Review and summation of old records:    No chest pain.  Negative dobutamine stress echocardiogram.  Patient is on Lipitor 20 mg daily.  Aspirin 81 mg daily. No Continue current medications:     Yes           2. Shortness of breath  Stable   O2 sat in the office 100%.  Negative dobutamine stress echocardiogram.  Unremarkable 2D echo.  Did recommend smoking cessation. No Continue current

## 2024-07-18 NOTE — PROGRESS NOTES
Mulugeta Black (:  1960) is a 63 y.o. male,New patient, here for evaluation of the following chief complaint(s):  established Patient (PVD)            SUBJECTIVE/OBJECTIVE:  Mulugeta has a history of peripheral vascular disease of the lower extremities.  He has had this for < 1 year. Current treatment includes none.  Mulugeta has not had new wounds. Recently, he reports claudication at a distance of  200 feet.  Mulugeta reports that the left leg is more significant than the right.  He reports claudication is worsened and is mostly in the form of crampy type pain starting in the calves. He has a short recovery time. This is reproducible in nature. He reports ischemic rest pain 0 times per night.  He reports walking with cart does not help. He came in today because of new wound.  Started in the last week    Has bilateral lower extremity edema.  Does not wear support hose.  Has not had hx of dvt    I have personally reviewed the following: problem list, current meds, allergies, PMH, PSH, family hx, and social hx  Mulugeta Black is a 63 y.o. male with the following history as recorded in [facility]:  Patient Active Problem List    Diagnosis Date Noted    Hyperlipidemia 2024    Atherosclerosis of native artery of both lower extremities with intermittent claudication (HCC) 2024     Current Outpatient Medications   Medication Sig Dispense Refill    atorvastatin (LIPITOR) 20 MG tablet TAKE 1 TABLET BY MOUTH ONCE DAILY WITH SUPPER FOR CHOLESTEROL      EQ ASPIRIN ADULT LOW DOSE 81 MG EC tablet Take 1 tablet by mouth daily      mupirocin (BACTROBAN) 2 % cream Apply topically 3 times daily. 1 each 0    vitamin B-1 (THIAMINE) 100 MG tablet Take 1 tablet by mouth daily      vitamin B-12 (CYANOCOBALAMIN) 500 MCG tablet Take 1 tablet by mouth daily      vitamin E 400 UNIT capsule Take 1 capsule by mouth daily      furosemide (LASIX) 20 MG tablet Take 1 tablet by mouth daily as needed (ankle swelling) 30 tablet 5     No

## 2024-07-19 ENCOUNTER — HOSPITAL ENCOUNTER (OUTPATIENT)
Dept: INTERVENTIONAL RADIOLOGY/VASCULAR | Age: 64
Discharge: HOME | End: 2024-07-19
Payer: MEDICARE

## 2024-07-19 VITALS
WEIGHT: 122 LBS | BODY MASS INDEX: 18.07 KG/M2 | TEMPERATURE: 97.6 F | SYSTOLIC BLOOD PRESSURE: 107 MMHG | HEIGHT: 69 IN | RESPIRATION RATE: 17 BRPM | HEART RATE: 73 BPM | DIASTOLIC BLOOD PRESSURE: 84 MMHG | OXYGEN SATURATION: 99 %

## 2024-07-19 DIAGNOSIS — I70.213 ATHEROSCLEROSIS OF NATIVE ARTERIES OF EXTREMITIES WITH INTERMITTENT CLAUDICATION, BILATERAL LEGS (HCC): ICD-10-CM

## 2024-07-19 LAB
GLUCOSE BLD-MCNC: 96 MG/DL (ref 70–99)
PERFORMED ON: NORMAL

## 2024-07-19 PROCEDURE — 75716 ARTERY X-RAYS ARMS/LEGS: CPT

## 2024-07-19 PROCEDURE — C1887 CATHETER, GUIDING: HCPCS

## 2024-07-19 PROCEDURE — 36247 INS CATH ABD/L-EXT ART 3RD: CPT

## 2024-07-19 PROCEDURE — 99153 MOD SED SAME PHYS/QHP EA: CPT

## 2024-07-19 PROCEDURE — 99152 MOD SED SAME PHYS/QHP 5/>YRS: CPT

## 2024-07-19 PROCEDURE — 2580000003 HC RX 258: Performed by: NURSE PRACTITIONER

## 2024-07-19 PROCEDURE — 6360000002 HC RX W HCPCS: Performed by: NURSE PRACTITIONER

## 2024-07-19 PROCEDURE — 6360000002 HC RX W HCPCS: Performed by: SURGERY

## 2024-07-19 PROCEDURE — 6360000004 HC RX CONTRAST MEDICATION: Performed by: SURGERY

## 2024-07-19 PROCEDURE — 75625 CONTRAST EXAM ABDOMINL AORTA: CPT

## 2024-07-19 PROCEDURE — 6370000000 HC RX 637 (ALT 250 FOR IP): Performed by: NURSE PRACTITIONER

## 2024-07-19 PROCEDURE — 2500000003 HC RX 250 WO HCPCS: Performed by: SURGERY

## 2024-07-19 RX ORDER — SODIUM CHLORIDE 0.9 % (FLUSH) 0.9 %
5-40 SYRINGE (ML) INJECTION PRN
Status: DISCONTINUED | OUTPATIENT
Start: 2024-07-19 | End: 2024-07-21 | Stop reason: HOSPADM

## 2024-07-19 RX ORDER — SODIUM CHLORIDE 0.9 % (FLUSH) 0.9 %
5-40 SYRINGE (ML) INJECTION EVERY 12 HOURS SCHEDULED
Status: DISCONTINUED | OUTPATIENT
Start: 2024-07-19 | End: 2024-07-21 | Stop reason: HOSPADM

## 2024-07-19 RX ORDER — LIDOCAINE HYDROCHLORIDE 20 MG/ML
INJECTION, SOLUTION EPIDURAL; INFILTRATION; INTRACAUDAL; PERINEURAL PRN
Status: COMPLETED | OUTPATIENT
Start: 2024-07-19 | End: 2024-07-19

## 2024-07-19 RX ORDER — FENTANYL CITRATE 50 UG/ML
INJECTION, SOLUTION INTRAMUSCULAR; INTRAVENOUS PRN
Status: COMPLETED | OUTPATIENT
Start: 2024-07-19 | End: 2024-07-19

## 2024-07-19 RX ORDER — CLONIDINE HYDROCHLORIDE 0.1 MG/1
0.1 TABLET ORAL PRN
Status: DISCONTINUED | OUTPATIENT
Start: 2024-07-19 | End: 2024-07-21 | Stop reason: HOSPADM

## 2024-07-19 RX ORDER — MIDAZOLAM HYDROCHLORIDE 1 MG/ML
INJECTION INTRAMUSCULAR; INTRAVENOUS PRN
Status: COMPLETED | OUTPATIENT
Start: 2024-07-19 | End: 2024-07-19

## 2024-07-19 RX ORDER — HEPARIN SODIUM 5000 [USP'U]/ML
INJECTION, SOLUTION INTRAVENOUS; SUBCUTANEOUS PRN
Status: COMPLETED | OUTPATIENT
Start: 2024-07-19 | End: 2024-07-19

## 2024-07-19 RX ORDER — ASPIRIN 81 MG/1
81 TABLET ORAL ONCE
Status: COMPLETED | OUTPATIENT
Start: 2024-07-19 | End: 2024-07-19

## 2024-07-19 RX ORDER — IODIXANOL 320 MG/ML
INJECTION, SOLUTION INTRAVASCULAR PRN
Status: COMPLETED | OUTPATIENT
Start: 2024-07-19 | End: 2024-07-19

## 2024-07-19 RX ORDER — SODIUM CHLORIDE 9 MG/ML
INJECTION, SOLUTION INTRAVENOUS CONTINUOUS
Status: DISCONTINUED | OUTPATIENT
Start: 2024-07-19 | End: 2024-07-21 | Stop reason: HOSPADM

## 2024-07-19 RX ORDER — SODIUM CHLORIDE 9 MG/ML
INJECTION, SOLUTION INTRAVENOUS PRN
Status: DISCONTINUED | OUTPATIENT
Start: 2024-07-19 | End: 2024-07-21 | Stop reason: HOSPADM

## 2024-07-19 RX ADMIN — FENTANYL CITRATE 25 MCG: 50 INJECTION, SOLUTION INTRAMUSCULAR; INTRAVENOUS at 10:53

## 2024-07-19 RX ADMIN — ASPIRIN 81 MG: 81 TABLET, COATED ORAL at 10:19

## 2024-07-19 RX ADMIN — IODIXANOL 120 ML: 320 INJECTION, SOLUTION INTRAVASCULAR at 11:55

## 2024-07-19 RX ADMIN — HEPARIN SODIUM 3000 UNITS: 5000 INJECTION INTRAVENOUS; SUBCUTANEOUS at 11:05

## 2024-07-19 RX ADMIN — WATER 2000 MG: 1 INJECTION INTRAMUSCULAR; INTRAVENOUS; SUBCUTANEOUS at 10:35

## 2024-07-19 RX ADMIN — MIDAZOLAM 1 MG: 1 INJECTION INTRAMUSCULAR; INTRAVENOUS at 10:53

## 2024-07-19 RX ADMIN — SODIUM CHLORIDE: 9 INJECTION, SOLUTION INTRAVENOUS at 10:20

## 2024-07-19 RX ADMIN — LIDOCAINE HYDROCHLORIDE 10 ML: 20 INJECTION, SOLUTION EPIDURAL; INFILTRATION; INTRACAUDAL; PERINEURAL at 10:53

## 2024-07-19 RX ADMIN — HEPARIN SODIUM 5000 UNITS: 5000 INJECTION INTRAVENOUS; SUBCUTANEOUS at 10:54

## 2024-07-19 NOTE — OP NOTE
Patient Name: Mulugeta Black   YOB: 1960   MRN: [id number]     Procedure Date: 7/19/2024     Pre Op Diagnosis: PAD with left leg pain    Post Op Diagnosis: same    Procedure: Aortogram with bilateral lower extremity runoff    Anesthesia: Local with Moderate Sedation  Anesthesia: local with moderate sedation  Moderate sedation ASA class III  Timing start: ***  End time:***  Given 1 Versed and 25 Fentanyl  Vital signs were observed by separate provider that had no other duties    Estimated Blood Loss: Less than 50 ml    Complications: None    Implants: none    Procedure Details: Patient was brought to angiogram suite and placed in supine position. His bilateral groins were prepped and draped in sterile fashion. Preoperative antibiotics were given. Time out performed.  Right common femoral artery was accessed using standard  micropuncture technique. 5 Fr glide sheath was placed. J wire was advanced proximally followed by omni flush catheter. Using power injection aortogram with bilateral runoff were performed with mentioned below findings.  Patient was given 3000u of Heparin.  I decided to attempt to cross through the left SFA/above knee popliteal artery. Using omni flush catheter glide Advantage wire  was advanced to left SFA. The catheter and sheath were removed and 6 x 45 Terumo destination sheath was placed. Then Navicros was placed. Wire was changed to V18. I attempted to navigate those through the occlusion, but was not successful. Device were removed. Manual pressure held until hemostasis. Patient tolerated procedure well. He transferred back to PACU in stable condition.  Plan to schedule patient in the OR with left pedal access.    Findings: Aorta normal caliber. Bilateral renal arteries are patent. Bilateral common, internal and extrarenal iliac arteries patent.  Right common femoral arter patent, profunda patent, profunda patent, SFA, popliteal patent, 3 -vessel runoff.  Left common femoral

## 2024-07-19 NOTE — PROGRESS NOTES
Pt ambulated with RN without complication, R groin site cdi with no s/s of bleeding or hematoma after ambulation. Pt with no c/o pain. Discharge instructions reviewed with patient and patient states understanding. Patient discharged from cath holding with all belongings and AVS. Pt's brother plans to drive pt home. Electronically signed by Jeannie Crabtree RN on 7/19/2024 at 3:29 PM

## 2024-07-19 NOTE — INTERVAL H&P NOTE
Update History & Physical    The patient's History and Physical of July 18, 2024 was reviewed with the patient and I examined the patient. There was no change. The surgical site was confirmed by the patient and me.     Plan: The risks, benefits, expected outcome, and alternative to the recommended procedure have been discussed with the patient. Patient understands and wants to proceed with the procedure.     Moderate sedation  ASA III, Mallampati 3    Electronically signed by Maricarmen Latif DO on 7/19/2024 at 10:34 AM

## 2024-07-24 ENCOUNTER — HOSPITAL ENCOUNTER (OUTPATIENT)
Dept: WOUND CARE | Age: 64
Discharge: HOME | End: 2024-07-24
Payer: MEDICARE

## 2024-07-24 ENCOUNTER — PREP FOR PROCEDURE (OUTPATIENT)
Dept: VASCULAR SURGERY | Age: 64
End: 2024-07-24

## 2024-07-24 VITALS
TEMPERATURE: 96.8 F | HEART RATE: 101 BPM | RESPIRATION RATE: 18 BRPM | SYSTOLIC BLOOD PRESSURE: 134 MMHG | BODY MASS INDEX: 18.07 KG/M2 | DIASTOLIC BLOOD PRESSURE: 81 MMHG | HEIGHT: 69 IN | WEIGHT: 122 LBS

## 2024-07-24 DIAGNOSIS — Z01.818 PRE-OP TESTING: Primary | ICD-10-CM

## 2024-07-24 DIAGNOSIS — F17.200 SMOKING: ICD-10-CM

## 2024-07-24 DIAGNOSIS — L97.422 ISCHEMIC ULCER OF LEFT HEEL WITH FAT LAYER EXPOSED (HCC): ICD-10-CM

## 2024-07-24 DIAGNOSIS — I70.213 ATHEROSCLEROSIS OF NATIVE ARTERY OF BOTH LOWER EXTREMITIES WITH INTERMITTENT CLAUDICATION (HCC): Primary | ICD-10-CM

## 2024-07-24 PROCEDURE — 99215 OFFICE O/P EST HI 40 MIN: CPT | Performed by: NURSE PRACTITIONER

## 2024-07-24 PROCEDURE — 99214 OFFICE O/P EST MOD 30 MIN: CPT

## 2024-07-24 RX ORDER — LIDOCAINE HYDROCHLORIDE 20 MG/ML
JELLY TOPICAL ONCE
OUTPATIENT
Start: 2024-07-24 | End: 2024-07-24

## 2024-07-24 RX ORDER — GENTAMICIN SULFATE 1 MG/G
OINTMENT TOPICAL ONCE
Status: CANCELLED | OUTPATIENT
Start: 2024-07-24 | End: 2024-07-24

## 2024-07-24 RX ORDER — SODIUM CHLOR/HYPOCHLOROUS ACID 0.033 %
SOLUTION, IRRIGATION IRRIGATION ONCE
OUTPATIENT
Start: 2024-07-24 | End: 2024-07-24

## 2024-07-24 RX ORDER — LIDOCAINE HYDROCHLORIDE 20 MG/ML
JELLY TOPICAL ONCE
Status: CANCELLED | OUTPATIENT
Start: 2024-07-24 | End: 2024-07-24

## 2024-07-24 RX ORDER — SODIUM CHLOR/HYPOCHLOROUS ACID 0.033 %
SOLUTION, IRRIGATION IRRIGATION ONCE
Status: CANCELLED | OUTPATIENT
Start: 2024-07-24 | End: 2024-07-24

## 2024-07-24 RX ORDER — LIDOCAINE HYDROCHLORIDE 40 MG/ML
SOLUTION TOPICAL ONCE
Status: CANCELLED | OUTPATIENT
Start: 2024-07-24 | End: 2024-07-24

## 2024-07-24 RX ORDER — GINSENG 100 MG
CAPSULE ORAL ONCE
Status: CANCELLED | OUTPATIENT
Start: 2024-07-24 | End: 2024-07-24

## 2024-07-24 RX ORDER — IBUPROFEN 200 MG
TABLET ORAL ONCE
Status: CANCELLED | OUTPATIENT
Start: 2024-07-24 | End: 2024-07-24

## 2024-07-24 RX ORDER — BETAMETHASONE DIPROPIONATE 0.5 MG/G
CREAM TOPICAL ONCE
OUTPATIENT
Start: 2024-07-24 | End: 2024-07-24

## 2024-07-24 RX ORDER — CLOBETASOL PROPIONATE 0.5 MG/G
OINTMENT TOPICAL ONCE
Status: CANCELLED | OUTPATIENT
Start: 2024-07-24 | End: 2024-07-24

## 2024-07-24 RX ORDER — GENTAMICIN SULFATE 1 MG/G
OINTMENT TOPICAL ONCE
OUTPATIENT
Start: 2024-07-24 | End: 2024-07-24

## 2024-07-24 RX ORDER — LIDOCAINE 40 MG/G
CREAM TOPICAL ONCE
OUTPATIENT
Start: 2024-07-24 | End: 2024-07-24

## 2024-07-24 RX ORDER — GINSENG 100 MG
CAPSULE ORAL ONCE
OUTPATIENT
Start: 2024-07-24 | End: 2024-07-24

## 2024-07-24 RX ORDER — BACITRACIN ZINC AND POLYMYXIN B SULFATE 500; 1000 [USP'U]/G; [USP'U]/G
OINTMENT TOPICAL ONCE
Status: CANCELLED | OUTPATIENT
Start: 2024-07-24 | End: 2024-07-24

## 2024-07-24 RX ORDER — BACITRACIN ZINC AND POLYMYXIN B SULFATE 500; 1000 [USP'U]/G; [USP'U]/G
OINTMENT TOPICAL ONCE
OUTPATIENT
Start: 2024-07-24 | End: 2024-07-24

## 2024-07-24 RX ORDER — BETAMETHASONE DIPROPIONATE 0.5 MG/G
CREAM TOPICAL ONCE
Status: CANCELLED | OUTPATIENT
Start: 2024-07-24 | End: 2024-07-24

## 2024-07-24 RX ORDER — TRIAMCINOLONE ACETONIDE 1 MG/G
OINTMENT TOPICAL ONCE
OUTPATIENT
Start: 2024-07-24 | End: 2024-07-24

## 2024-07-24 RX ORDER — CLOBETASOL PROPIONATE 0.5 MG/G
OINTMENT TOPICAL ONCE
OUTPATIENT
Start: 2024-07-24 | End: 2024-07-24

## 2024-07-24 RX ORDER — IBUPROFEN 200 MG
TABLET ORAL ONCE
OUTPATIENT
Start: 2024-07-24 | End: 2024-07-24

## 2024-07-24 RX ORDER — LIDOCAINE 50 MG/G
OINTMENT TOPICAL ONCE
OUTPATIENT
Start: 2024-07-24 | End: 2024-07-24

## 2024-07-24 RX ORDER — LIDOCAINE HYDROCHLORIDE 40 MG/ML
SOLUTION TOPICAL ONCE
OUTPATIENT
Start: 2024-07-24 | End: 2024-07-24

## 2024-07-24 RX ORDER — TRIAMCINOLONE ACETONIDE 1 MG/G
OINTMENT TOPICAL ONCE
Status: CANCELLED | OUTPATIENT
Start: 2024-07-24 | End: 2024-07-24

## 2024-07-24 RX ORDER — LIDOCAINE 40 MG/G
CREAM TOPICAL ONCE
Status: CANCELLED | OUTPATIENT
Start: 2024-07-24 | End: 2024-07-24

## 2024-07-24 RX ORDER — LIDOCAINE 50 MG/G
OINTMENT TOPICAL ONCE
Status: CANCELLED | OUTPATIENT
Start: 2024-07-24 | End: 2024-07-24

## 2024-07-24 ASSESSMENT — VISUAL ACUITY: OU: 1

## 2024-07-24 NOTE — PROGRESS NOTES
Patient Care Team:  Crystal Young APRN - CNP as PCP - General (Nurse Practitioner)    TODAY'S DATE:  7/24/2024     HISTORY of PRESENTILLNESS HPI   Mulugeta Black is a 63 y.o. male who presents today for wound evaluation.  He reports he developed a wound on left foot.This started 1 week(s) ago. He believes this is  healing. He has been applying nothing. He has not had  fever orchills. He has a history of PAD.  Dr. Latif performed an arteriogram but was unable to restore blood flow.  He should be scheduled Monday for this again.  Currently the area is dry and intact.  We discussed offloading and smoking cessation at length. Follow up as needed in the future, brother will contact office immediately if wound is draining.  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 male with the following history reviewed and recorded in [facility]:    Current Outpatient Medications   Medication Sig Dispense Refill    atorvastatin (LIPITOR) 20 MG tablet TAKE 1 TABLET BY MOUTH ONCE DAILY WITH SUPPER FOR CHOLESTEROL      EQ ASPIRIN ADULT LOW DOSE 81 MG EC tablet Take 1 tablet by mouth daily      vitamin B-1 (THIAMINE) 100 MG tablet Take 1 tablet by mouth daily      vitamin B-12 (CYANOCOBALAMIN) 500 MCG tablet Take 1 tablet by mouth daily      vitamin E 400 UNIT capsule Take 1 capsule by mouth daily      furosemide (LASIX) 20 MG tablet Take 1 tablet by mouth daily as needed (ankle swelling) 30 tablet 5     No current facility-administered medications for this encounter.     Allergies: Patient has no known allergies.  Past Medical History:   Diagnosis Date

## 2024-07-24 NOTE — PLAN OF CARE
Problem: Wound:  Goal: Will show signs of wound healing; wound closure and no evidence of infection  Description: Will show signs of wound healing; wound closure and no evidence of infection  Outcome: Completed     Problem: Arterial:  Goal: Optimize blood flow for wound healing  Description: Optimize blood flow for wound healing  Outcome: Completed   Having procedure, f/u as needed

## 2024-07-24 NOTE — PATIENT INSTRUCTIONS
Okay to shower.  Do not apply any salve, cream, peroxide or alcohol to the incision.  Call with any increasing redness or drainage     [facility] follow up visit __as needed___________________________  (Please note your next appointment above and if you are unable to keep, kindly give a 24 hour notice. Thank you.)          If you experience any of the following, please call the Wound Care Center during business hours:    * Increase in Pain  * Temperature over 101  * Increase in drainage from your wound  * Drainage with a foul odor  * Bleeding  * Increase in swelling  * Need for compression bandage changes due to slippage, breakthrough drainage.    If you need medical attention outside of the business hours of the Wound Care Centers please contact your PCP or go to the nearest emergency room.

## 2024-07-24 NOTE — DISCHARGE INSTRUCTIONS
[facility] Wound Care and Hyperbaric Oxygen Therapy   Physician Orders and Discharge Instructions  [address] [place] [address] Drive  [address]  [place] [address]  Telephone: [phone]      FAX [phone]    NAME:  Mulugeta Black  YOB: 1960  MEDICAL RECORD NUMBER:  [id number]  DATE:  7/24/2024    Discharge condition: Stable    Discharge to: Home    Left via:Private automobile    Accompanied by:  sibling    ECF/HHA:     Dressing Orders:  Left foot wound: keep area dry    Treatment Orders:  Stop smoking           Surgery Directions     Report to the outpatient registration at [facility] on Monday,        07/29/2024 @ 6:00 AM.   Nothing to eat or drink after midnight the night before the procedure.  Please take all medications as normally scheduled to take unless listed below with a sip of water.  Do not take Lasix the morning of the surgery.   If you have sleep apnea and require C-PAP, please bring this with you to the hospital.  Bring a list of all of your allergies and medications with you to the hospital.  Please let our nurse know if you have had an allergy to iodine, shellfish, or x-ray dye.  Let the nurse know if you take any of the following:  Over the counter herbal supplements  Diclofenec, indomethacin, ketoprofen, Caridopa/levadopa, naproxen, sulindac, piroxicam, glucosamine, Chondrotin, cocchine, or methotrexate.  Plan to stay at the hospital for 4 - 6 hours before being released  by the physician. You will need someone to drive you home after the procedure.  Please register at the outpatient area of the [facility] on 07/25/2024 @ 10:00 AM for pre-op testing.  You will not need to be fasting prior to this appointment.   11.  You will need a  to take you home.  12.  Follow up appt: 08/12/2024 @ 8:15 AM with [facility].      Unless instructed otherwise by your physician, cleanse incision/puncture site twice daily with soap and water.  Apply dry gauze. Do not get in

## 2024-07-25 RX ORDER — SODIUM CHLORIDE 0.9 % (FLUSH) 0.9 %
5-40 SYRINGE (ML) INJECTION PRN
Status: CANCELLED | OUTPATIENT
Start: 2024-07-25

## 2024-07-25 RX ORDER — ASPIRIN 81 MG/1
81 TABLET ORAL ONCE
Status: CANCELLED | OUTPATIENT
Start: 2024-07-25 | End: 2024-07-25

## 2024-07-25 RX ORDER — SODIUM CHLORIDE 0.9 % (FLUSH) 0.9 %
5-40 SYRINGE (ML) INJECTION EVERY 12 HOURS SCHEDULED
Status: CANCELLED | OUTPATIENT
Start: 2024-07-25

## 2024-07-25 RX ORDER — SODIUM CHLORIDE 9 MG/ML
INJECTION, SOLUTION INTRAVENOUS PRN
Status: CANCELLED | OUTPATIENT
Start: 2024-07-25

## 2024-07-26 ENCOUNTER — TELEPHONE (OUTPATIENT)
Dept: VASCULAR SURGERY | Age: 64
End: 2024-07-26

## 2024-07-26 NOTE — TELEPHONE ENCOUNTER
Left a message for the patient that his surgery has been cancelled for Monday, 07/29/2024 due to insurance denial.  I instructed the patient if his foot gets any worse this weekend to go to the ER.  I have also cancelled the patient's pre-op testing for today.  I left my back office line for the patient to return my phone call with any questions.

## 2024-08-07 ENCOUNTER — TELEPHONE (OUTPATIENT)
Dept: VASCULAR SURGERY | Age: 64
End: 2024-08-07

## 2024-08-07 NOTE — TELEPHONE ENCOUNTER
Called the patient and we discussed his denial of vascular surgery.  I let him know that we can't refile the claim for 60 days.  If he has any problems he is welcome to contact our office and we will be glad to help him and can send him through the ER.  We have made the patient an appointment for 60 days to see him again to get the surgery rescheduled.  The patient is aware of the date and time of the appointment.  He voiced understanding.

## 2024-08-22 ENCOUNTER — OFFICE VISIT (OUTPATIENT)
Dept: NEUROLOGY | Age: 64
End: 2024-08-22
Payer: MEDICARE

## 2024-08-22 VITALS
HEART RATE: 88 BPM | BODY MASS INDEX: 18.07 KG/M2 | SYSTOLIC BLOOD PRESSURE: 126 MMHG | HEIGHT: 69 IN | DIASTOLIC BLOOD PRESSURE: 78 MMHG | WEIGHT: 122 LBS | OXYGEN SATURATION: 97 %

## 2024-08-22 DIAGNOSIS — R41.3 MEMORY LOSS: ICD-10-CM

## 2024-08-22 DIAGNOSIS — Z86.39 HISTORY OF HYPERLIPIDEMIA: ICD-10-CM

## 2024-08-22 DIAGNOSIS — F10.11 HISTORY OF ALCOHOL ABUSE: ICD-10-CM

## 2024-08-22 DIAGNOSIS — G25.0 ESSENTIAL TREMOR: ICD-10-CM

## 2024-08-22 DIAGNOSIS — R41.3 MEMORY LOSS: Primary | ICD-10-CM

## 2024-08-22 LAB
FOLATE SERPL-MCNC: 10 NG/ML (ref 4.5–32.2)
VIT B12 SERPL-MCNC: 608 PG/ML (ref 211–946)

## 2024-08-22 PROCEDURE — 3017F COLORECTAL CA SCREEN DOC REV: CPT | Performed by: PSYCHIATRY & NEUROLOGY

## 2024-08-22 PROCEDURE — 4004F PT TOBACCO SCREEN RCVD TLK: CPT | Performed by: PSYCHIATRY & NEUROLOGY

## 2024-08-22 PROCEDURE — G8419 CALC BMI OUT NRM PARAM NOF/U: HCPCS | Performed by: PSYCHIATRY & NEUROLOGY

## 2024-08-22 PROCEDURE — 99204 OFFICE O/P NEW MOD 45 MIN: CPT | Performed by: PSYCHIATRY & NEUROLOGY

## 2024-08-22 PROCEDURE — G8427 DOCREV CUR MEDS BY ELIG CLIN: HCPCS | Performed by: PSYCHIATRY & NEUROLOGY

## 2024-08-22 RX ORDER — MEMANTINE HYDROCHLORIDE 5 MG/1
TABLET ORAL
Qty: 120 TABLET | Refills: 0 | Status: SHIPPED | OUTPATIENT
Start: 2024-08-22 | End: 2024-08-23

## 2024-08-22 NOTE — PROGRESS NOTES
REVIEW OF SYSTEMS    Constitutional: []Fever []Sweats []Chills [] Recent Injury   [x] Denies all unless marked  HENT:[]Headache  [] Head Injury  [] Sore Throat  [] Ear Pain  [] Dizziness [] Hearing Loss   [x] Denies all unless marked  Spine:  [] Neck pain  [] Back pain  [] Sciatica  [x] Denies all unless marked  Cardiovascular:[]Chest Pain []Palpitations [] Heart Disease  [x] Denies all unless marked  Pulmonary: []Shortness of Breath []Cough   [x] Denies all unless marked  Gastrointestinal:  []Abdominal Pain  []Blood in Stool  []Diarrhea []Constipation []Nausea  []Vomiting  [x] Denies all unless marked  Genitourinary:  [] Dysuria [] Frequency  [] Incontinence [] Urgency   [x] Denies all unless marked  Musculoskeletal: [] Arthralgia  [] Myalgias [] Muscle cramps  [] Muscle twitches   [x] Denies all unless marked   Extremities:   [] Pain   [] Swelling   [x] Denies all unless marked  Skin:[] Rash  [] Color Change  [x] Denies all unless marked  Neurological:[] Visual Disturbance [] Double Vision [] Slurred Speech [] Trouble swallowing  [] Vertigo [] Tingling [] Numbness [] Weakness [] Loss of Balance   [] Loss of Consciousness [] Memory Loss [] Seizures  [x] Denies all unless marked  Psychiatric/Behavioral:[] Depression [] Anxiety  [x] Denies all unless marked  Sleep: []  Insomnia [] Sleep Disturbance [] Snoring [] Restless Legs [] Daytime Sleepiness [] Sleep Apnea  [x] Denies all unless marked    
bilaterally, no cogwheeling, normal tone    Sensory Exam  Sensation intact to light touch , PP  upper and lower extremities bilaterally; normal vibration sense in LE's    Reflexes   2+ biceps bilaterally  2+ brachioradialis  2+ triceps  2+patella  2+ ankle jerks  No clonus ankles.  No Babinski sign.    Tremors-moderate postural tremor noted    Gait  Normal base and speed  No ataxia. No Romberg sign    Coordination  Finger to nose and LULU-unremarkable    No results found for: \"YHQXCXDZ08\"  Lab Results   Component Value Date    WBC 5.6 2024    HGB 15.1 2024    HCT 45.0 2024    .2 (H) 2024     2024     Lab Results   Component Value Date     2024    K 3.8 2024     2024    CO2 24 2024    BUN 5 (L) 2024    CREATININE 0.7 2024    GLUCOSE 135 (H) 2024    CALCIUM 9.4 2024    LABGLOM >90 2024           Assessment    ICD-10-CM    1. Memory loss  R41.3 MRI BRAIN WO CONTRAST     Vitamin B12 & Folate      2. History of alcohol abuse  F10.11 MRI BRAIN WO CONTRAST     Vitamin B12 & Folate      3. History of hyperlipidemia  Z86.39 MRI BRAIN WO CONTRAST     Vitamin B12 & Folate      4. Essential tremor  G25.0         Probable underlying dementia.  Unclear how much of it is related to alcohol abuse.  Check MRI of the brain with B12 level.  Trial of Namenda recommended.  Prescription sent in.    Plan  Orders Placed This Encounter   Procedures    MRI BRAIN WO CONTRAST     Standing Status:   Future     Standing Expiration Date:   2025    Vitamin B12 & Folate     Standing Status:   Future     Number of Occurrences:   1     Standing Expiration Date:   2025         Orders Placed This Encounter   Medications    memantine (NAMENDA) 5 MG tablet     Si daily for a week, then twice daily for a week, then 1 am and 2 pm for a week, then 2 am and pm     Dispense:  120 tablet     Refill:  0       Pt warned of potential side

## 2024-08-23 ENCOUNTER — TELEPHONE (OUTPATIENT)
Dept: NEUROLOGY | Age: 64
End: 2024-08-23

## 2024-08-23 RX ORDER — MEMANTINE HYDROCHLORIDE 5 MG/1
TABLET ORAL
Qty: 60 TABLET | Refills: 0 | COMMUNITY
Start: 2024-08-23

## 2024-08-23 RX ORDER — MEMANTINE HYDROCHLORIDE 10 MG/1
10 TABLET ORAL 2 TIMES DAILY
Refills: 5 | COMMUNITY

## 2024-08-23 NOTE — TELEPHONE ENCOUNTER
Patient brother returning call to inform that patient picked up the medication Patient brother requesting a call back.

## 2024-08-23 NOTE — TELEPHONE ENCOUNTER
Medication was sent in as follows    Memantine 5mg 1 daily for a week, then twice daily for a week, then 1 am and 2 pm for a week, then 2 am and pm     Insurance will not cover that.     We called and gave a verbal at the pharmacy for Memantine 5mg twice daily and another script for Memantine 10mg twice daily.    I called the patient brother and left him a message to call us back so I could explain to him the instructions since they will not be on the bottle the first month.

## 2024-08-26 NOTE — TELEPHONE ENCOUNTER
Patient paid out of pocket for the medication so it had the instructions on the bottle. I let his brother know that the 10mg BID should be covered by insurance next month and if they have any questions they can give us a call. He gave a verbal understanding.

## 2024-09-20 ENCOUNTER — HOSPITAL ENCOUNTER (OUTPATIENT)
Dept: MRI IMAGING | Age: 64
Discharge: HOME | End: 2024-09-20
Attending: PSYCHIATRY & NEUROLOGY
Payer: MEDICARE

## 2024-09-20 DIAGNOSIS — R41.3 MEMORY LOSS: ICD-10-CM

## 2024-09-20 DIAGNOSIS — F10.11 HISTORY OF ALCOHOL ABUSE: ICD-10-CM

## 2024-09-20 DIAGNOSIS — Z86.39 HISTORY OF HYPERLIPIDEMIA: ICD-10-CM

## 2024-09-20 PROCEDURE — 70551 MRI BRAIN STEM W/O DYE: CPT

## 2024-12-06 ENCOUNTER — OFFICE VISIT (OUTPATIENT)
Dept: NEUROLOGY | Age: 64
End: 2024-12-06
Payer: MEDICARE

## 2024-12-06 VITALS
RESPIRATION RATE: 16 BRPM | DIASTOLIC BLOOD PRESSURE: 80 MMHG | HEIGHT: 69 IN | HEART RATE: 96 BPM | OXYGEN SATURATION: 98 % | WEIGHT: 122 LBS | SYSTOLIC BLOOD PRESSURE: 140 MMHG | BODY MASS INDEX: 18.07 KG/M2

## 2024-12-06 DIAGNOSIS — F10.11 HISTORY OF ALCOHOL ABUSE: ICD-10-CM

## 2024-12-06 DIAGNOSIS — R93.0 ABNORMAL MRI OF HEAD: ICD-10-CM

## 2024-12-06 DIAGNOSIS — G25.0 ESSENTIAL TREMOR: ICD-10-CM

## 2024-12-06 DIAGNOSIS — R41.3 MEMORY LOSS: Primary | ICD-10-CM

## 2024-12-06 PROCEDURE — G8427 DOCREV CUR MEDS BY ELIG CLIN: HCPCS | Performed by: PSYCHIATRY & NEUROLOGY

## 2024-12-06 PROCEDURE — 4004F PT TOBACCO SCREEN RCVD TLK: CPT | Performed by: PSYCHIATRY & NEUROLOGY

## 2024-12-06 PROCEDURE — G8419 CALC BMI OUT NRM PARAM NOF/U: HCPCS | Performed by: PSYCHIATRY & NEUROLOGY

## 2024-12-06 PROCEDURE — G8484 FLU IMMUNIZE NO ADMIN: HCPCS | Performed by: PSYCHIATRY & NEUROLOGY

## 2024-12-06 PROCEDURE — 99213 OFFICE O/P EST LOW 20 MIN: CPT | Performed by: PSYCHIATRY & NEUROLOGY

## 2024-12-06 PROCEDURE — 3017F COLORECTAL CA SCREEN DOC REV: CPT | Performed by: PSYCHIATRY & NEUROLOGY

## 2024-12-06 RX ORDER — DONEPEZIL HYDROCHLORIDE 5 MG/1
TABLET, FILM COATED ORAL
Qty: 30 TABLET | Refills: 2 | Status: SHIPPED | OUTPATIENT
Start: 2024-12-06

## 2024-12-06 NOTE — PROGRESS NOTES
REVIEW OF SYSTEMS    Constitutional: []Fever []Sweat []Chills [] Recent Injury [x] Denies all unless marked  HEENT:[]Headache  [] Head Injury/Hearing Loss  [] Sore Throat  [] Ear Ache/Dizziness  [x] Denies all unless marked  Spine:  [] Neck pain  [] Back pain  [] Sciaticia  [x] Denies all unless marked  Cardiovascular:[]Heart Disease []Chest Pain [] Palpitations  [x] Denies all unless marked  Pulmonary: []Shortness of Breath []Cough   [x] Denies all unless marke  Gastrointestinal: []Nausea  []Vomiting  []Abdominal Pain  []Constipation  []Diarrhea  []Dark Bloody Stools  [x] Denies all unless marked  Psychiatric/Behavioral:[] Depression [] Anxiety [x] Denies all unless marked  Genitourinary:   [] Frequency  [] Urgency  [] Incontinence [] Pain with Urination  [x] Denies all unless marked  Extremities: []Pain  []Swelling  [x] Denies all unless marked  Musculoskeletal: [] Muscle Pain  [] Joint Pain  [] Arthritis [] Muscle Cramps [x] Muscle Twitches  [x] Denies all unless marked  Sleep: [] Insomnia [] Snoring [] Restless Legs [] Sleep Apnea  [] Daytime Sleepiness  [x] Denies all unless marked  Skin:[] Rash [] Skin Discoloration [x] Denies all unless marked   Neurological: []Visual Disturbance/Memory Loss [] Loss of Balance [] Slurred Speech/Weakness [] Seizures  [] Vertigo/Dizziness [x] Denies all unless marked

## 2024-12-06 NOTE — PROGRESS NOTES
Chief Complaint   Patient presents with    Memory Loss     Patient states tremors and memory loss is about the same       Mulugeta Black is a 64 y.o. year old male who is seen for evaluation of dementia.  He is here today with his brother.  He states he has been on disability for several years for his dementia.  He has a past history of heavy alcohol abuse.  The patient currently lives by himself and does his own ADLs but does not drive.    Is unclear if he has been on any medication for dementia in the past or what his workup may have been..  He appears to have some word finding difficulties at times.  Last seen here 8/24.  Namenda was added.  Doing about the same.  B12 level came back normal.  MRI showing prominent diffuse atrophy with minimal white matter change and encephalomalacia in the right parietal lobe.  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 Latif       Family History   Problem Relation Age of Onset    Heart Disease Mother     Heart Disease Father     Heart Attack Brother        No Known Allergies    Social History     Socioeconomic History    Marital status: Single     Spouse name: Not on file    Number of children: Not on file    Years of education: Not on file    Highest education level: Not on file   Occupational History    Not on file   Tobacco Use    Smoking status: Every Day     Current packs/day: 2.00     Types:

## 2024-12-11 ENCOUNTER — HOSPITAL ENCOUNTER (OUTPATIENT)
Dept: VASCULAR LAB | Age: 64
Discharge: HOME | End: 2024-12-13
Payer: MEDICARE

## 2024-12-11 ENCOUNTER — OFFICE VISIT (OUTPATIENT)
Dept: VASCULAR SURGERY | Age: 64
End: 2024-12-11
Payer: MEDICARE

## 2024-12-11 VITALS — DIASTOLIC BLOOD PRESSURE: 84 MMHG | OXYGEN SATURATION: 100 % | SYSTOLIC BLOOD PRESSURE: 138 MMHG | HEART RATE: 82 BPM

## 2024-12-11 DIAGNOSIS — I70.213 ATHEROSCLER OF NATIVE ARTERY OF BOTH LEGS WITH INTERMIT CLAUDICATION (HCC): ICD-10-CM

## 2024-12-11 DIAGNOSIS — I70.213 ATHEROSCLER OF NATIVE ARTERY OF BOTH LEGS WITH INTERMIT CLAUDICATION (HCC): Primary | ICD-10-CM

## 2024-12-11 PROCEDURE — 4004F PT TOBACCO SCREEN RCVD TLK: CPT | Performed by: NURSE PRACTITIONER

## 2024-12-11 PROCEDURE — 93923 UPR/LXTR ART STDY 3+ LVLS: CPT

## 2024-12-11 PROCEDURE — G8419 CALC BMI OUT NRM PARAM NOF/U: HCPCS | Performed by: NURSE PRACTITIONER

## 2024-12-11 PROCEDURE — 3017F COLORECTAL CA SCREEN DOC REV: CPT | Performed by: NURSE PRACTITIONER

## 2024-12-11 PROCEDURE — G8428 CUR MEDS NOT DOCUMENT: HCPCS | Performed by: NURSE PRACTITIONER

## 2024-12-11 PROCEDURE — G8484 FLU IMMUNIZE NO ADMIN: HCPCS | Performed by: NURSE PRACTITIONER

## 2024-12-11 PROCEDURE — 99214 OFFICE O/P EST MOD 30 MIN: CPT | Performed by: NURSE PRACTITIONER

## 2024-12-11 NOTE — PROGRESS NOTES
Take 1 tablet by mouth daily as needed (ankle swelling) 30 tablet 5     No current facility-administered medications for this visit.     Allergies: Patient has no known allergies.  Past Medical History:   Diagnosis Date    Acute ulcer of stomach     Atherosclerosis of native artery of both lower extremities with intermittent claudication (HCC)     Hyperlipidemia      Past Surgical History:   Procedure Laterality Date    COLONOSCOPY      NECK SURGERY      VASCULAR SURGERY Right 07/19/2024    Aortogram with bilateral lower extremity runoff by Dr. Latif     Family History   Problem Relation Age of Onset    Heart Disease Mother     Heart Disease Father     Heart Attack Brother      Social History     Tobacco Use    Smoking status: Every Day     Current packs/day: 2.00     Types: Cigarettes     Passive exposure: Current    Smokeless tobacco: Never   Substance Use Topics    Alcohol use: Yes     Comment: beer         ROS  Eyes - no sudden vision change or amaurosis.  Respiratory - no significant shortness of breath,  Cardiovascular - no chest pain or syncope.  No  significant leg swelling.  has claudication.  Musculoskeletal - no gait disturbance  Skin - no new wound.  Neurologic -  No speech difficulty or lateralizing weakness.  All other review of systems are negative.    Physical Exam    /84 (Site: Left Upper Arm, Position: Sitting, Cuff Size: Medium Adult)   Pulse 82   SpO2 100%       Neck- carotid pulses 2+ to palpation with no bruit  Cardiovascular - Regular rate and rhythm.    Pulmonary - effort appears normal.  No respiratory distress.    Lungs - Breath sounds normal. No wheezes or rales.    Extremities -  - Radial and brachial pulses are 2+ to palpation bilaterally.  Right femoral pulse: present 2+; Right popliteal pulse: absent Right DP: absent; Right PT absent; Left femoral pulse: present 2+; Left popliteal pulse: absent; Left DP: absent; Left PT: absent No cyanosis, clubbing, or significant edema.

## 2024-12-12 LAB
VAS LEFT ABI: 0.54
VAS LEFT ANKLE BP: 75 MMHG
VAS LEFT ARM BP: 135 MMHG
VAS LEFT CALF PRESSURE: 84 MMHG
VAS LEFT DORSALIS PEDIS BP: 75 MMHG
VAS LEFT HIGH THIGH PRESSURE: 103 MMHG
VAS LEFT LOW THIGH PRESSURE: 89 MMHG
VAS LEFT PTA BP: 69 MMHG
VAS LEFT TBI: 0.1
VAS LEFT TOE PRESSURE: 14 MMHG
VAS RIGHT ABI: 0.91
VAS RIGHT ANKLE BP: 126 MMHG
VAS RIGHT ARM BP: 138 MMHG
VAS RIGHT CALF PRESSURE: 133 MMHG
VAS RIGHT DORSALIS PEDIS BP: 126 MMHG
VAS RIGHT HIGH THIGH PRESSURE: 150 MMHG
VAS RIGHT LOW THIGH PRESSURE: 129 MMHG
VAS RIGHT PTA BP: 122 MMHG
VAS RIGHT TBI: 0.39
VAS RIGHT TOE PRESSURE: 54 MMHG

## 2024-12-30 ENCOUNTER — PREP FOR PROCEDURE (OUTPATIENT)
Dept: VASCULAR SURGERY | Age: 64
End: 2024-12-30

## 2024-12-30 ENCOUNTER — TELEPHONE (OUTPATIENT)
Dept: VASCULAR SURGERY | Age: 64
End: 2024-12-30

## 2024-12-30 DIAGNOSIS — Z01.818 PRE-OP TESTING: Primary | ICD-10-CM

## 2024-12-30 DIAGNOSIS — I70.213 ATHEROSCLER OF NATIVE ARTERY OF BOTH LEGS WITH INTERMIT CLAUDICATION (HCC): ICD-10-CM

## 2024-12-30 NOTE — TELEPHONE ENCOUNTER
Called and left a message for the patient to go over his surgery instructions.            Mulugeta Sofia    Surgery Directions    Report to the outpatient registration at [facility] on Monday,        02/03/2025.  The surgery department will contact you after 2:00 PM on        Friday to let you know what time to arrive @ the hospital for surgery.    Nothing to eat or drink after midnight the night before the procedure.  Please take all medications as normally scheduled to take unless listed below with a sip of water.  Do not take Laix the morning of the procedure.   If you have sleep apnea and require C-PAP, please bring this with you to the hospital.  Bring a list of all of your allergies and medications with you to the hospital.  Please let our nurse know if you have had an allergy to iodine, shellfish, or x-ray dye.  Let the nurse know if you take any of the following:  Over the counter herbal supplements  Diclofenec, indomethacin, ketoprofen, Caridopa/levadopa, naproxen, sulindac, piroxicam, glucosamine, Chondrotin, cocchine, or methotrexate.  Plan to stay at the hospital for 4 - 6 hours before being released  by the physician. You will need someone to drive you home after the procedure.  Please register at the outpatient area of the [facility] on 01/28/2025 @ 1:00 PM for pre-op testing.  You will not need to be fasting prior to this appointment.  Following this appointment please register in [facility]'s office for a chart review prior to surgery.    11. Other Directions: For any questions or concerns please contact our office @        [phone] and ask to speak to Crystal.   12.  You will need a  to take you home.  13.  Follow up appt: 02/25/2025 @ 8:00 AM in the vascular lab  for vascular          Testing.  Following this appointment please register in [facility]'s office.     Unless instructed otherwise by your physician, cleanse incision/puncture site twice daily with soap and water.  Apply

## 2025-01-02 NOTE — TELEPHONE ENCOUNTER
The patient returned my phone call and we discussed his surgery instructions.  The patient voiced understanding.

## 2025-01-28 ENCOUNTER — HOSPITAL ENCOUNTER (OUTPATIENT)
Dept: GENERAL RADIOLOGY | Age: 65
Discharge: HOME | End: 2025-01-28
Payer: MEDICARE

## 2025-01-28 ENCOUNTER — OFFICE VISIT (OUTPATIENT)
Dept: VASCULAR SURGERY | Age: 65
End: 2025-01-28
Payer: MEDICARE

## 2025-01-28 VITALS
DIASTOLIC BLOOD PRESSURE: 72 MMHG | SYSTOLIC BLOOD PRESSURE: 130 MMHG | HEART RATE: 98 BPM | TEMPERATURE: 98.2 F | OXYGEN SATURATION: 99 %

## 2025-01-28 DIAGNOSIS — I70.213 ATHEROSCLER OF NATIVE ARTERY OF BOTH LEGS WITH INTERMIT CLAUDICATION (HCC): Primary | ICD-10-CM

## 2025-01-28 PROCEDURE — 3017F COLORECTAL CA SCREEN DOC REV: CPT | Performed by: NURSE PRACTITIONER

## 2025-01-28 PROCEDURE — 99214 OFFICE O/P EST MOD 30 MIN: CPT | Performed by: NURSE PRACTITIONER

## 2025-01-28 PROCEDURE — 4004F PT TOBACCO SCREEN RCVD TLK: CPT | Performed by: NURSE PRACTITIONER

## 2025-01-28 PROCEDURE — G8427 DOCREV CUR MEDS BY ELIG CLIN: HCPCS | Performed by: NURSE PRACTITIONER

## 2025-01-28 PROCEDURE — 71046 X-RAY EXAM CHEST 2 VIEWS: CPT

## 2025-01-28 PROCEDURE — G8419 CALC BMI OUT NRM PARAM NOF/U: HCPCS | Performed by: NURSE PRACTITIONER

## 2025-01-29 RX ORDER — SODIUM CHLORIDE 9 MG/ML
INJECTION, SOLUTION INTRAVENOUS PRN
OUTPATIENT
Start: 2025-01-29

## 2025-01-29 RX ORDER — SODIUM CHLORIDE 0.9 % (FLUSH) 0.9 %
5-40 SYRINGE (ML) INJECTION EVERY 12 HOURS SCHEDULED
OUTPATIENT
Start: 2025-01-29

## 2025-01-29 RX ORDER — ASPIRIN 81 MG/1
81 TABLET ORAL ONCE
OUTPATIENT
Start: 2025-01-29 | End: 2025-01-29

## 2025-01-29 RX ORDER — SODIUM CHLORIDE 0.9 % (FLUSH) 0.9 %
5-40 SYRINGE (ML) INJECTION PRN
OUTPATIENT
Start: 2025-01-29

## 2025-01-29 NOTE — H&P (VIEW-ONLY)
Mulugeta Black (:  1960) is a 64 y.o. male,Established patient, here for evaluation of the following chief complaint(s):  Follow-up (Chart review prior to surgery. )            SUBJECTIVE/OBJECTIVE:  Mulugeta has a history of peripheral vascular disease of the lower extremities.  He has had this for 1 - 5 years. Current treatment includes asa and lipitor.  Mulugeta has not had new wounds. Recently, he reports claudication at a distance of  100 feet.  Mulugeta reports that the left leg is more significant than the right.  He reports claudication is worsened and is mostly in the form of crampy type pain starting in the calves. He has a short recovery time. This is reproducible in nature. He reports ischemic rest pain at night.  He reports walking with cart does not help. He has tried an exercise routine and this has not helped.  He is ready to proceed    I have personally reviewed the following: problem list, current meds, allergies, PMH, PSH, family hx, and social hx  Mulugeta Black is a 64 y.o. 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) 2024    Hyperlipidemia 2024    Atherosclerosis of native artery of both lower extremities with intermittent claudication (HCC) 2024     Current Outpatient Medications   Medication Sig Dispense Refill    Multiple Vitamins-Minerals (THERAPEUTIC MULTIVITAMIN-MINERALS) tablet Take 1 tablet by mouth daily      memantine (NAMENDA) 10 MG tablet Take 1 tablet by mouth 2 times daily  5    atorvastatin (LIPITOR) 20 MG tablet Take 1 tablet by mouth daily      EQ ASPIRIN ADULT LOW DOSE 81 MG EC tablet Take 1 tablet by mouth daily      furosemide (LASIX) 20 MG tablet Take 1 tablet by mouth daily as needed (ankle swelling) 30 tablet 5     No current facility-administered medications for this visit.     Allergies: Patient has no  Right PT absent; Left femoral pulse: present 2+; Left popliteal pulse: absent; Left DP: absent; Left PT: absent No cyanosis, clubbing, or significant edema.  No signs atheroembolic event.  Neurologic - alert and oriented X 3.  Physiologic.   Face symmetric.  Skin - warm, dry, and intact.  no wound  Psychiatric - mood, affect, and behavior appear normal.  Judgment and thought processes appear normal.    Risk factors for atherosclerosis of all vascular beds have been reviewed with the patient including:  Family history, tobacco abuse in all forms, elevated cholesterol, hyperlipidemia, and diabetes.    Lower extremity arterial study: Right CHE 0.91, Left CHE 0.54.  Individual films reviewed: Yes.  These results were reviewed with the patient.  Disease process is chronic illness with exacerbation, progression, or side effects of treatment  by review of waveforms, I see moderate progression of disease    Angio - Aorta normal caliber. Bilateral renal arteries are patent. Bilateral common, internal and extrarenal iliac arteries patent.  Right common femoral arter patent, profunda patent, profunda patent, SFA, popliteal patent, 3 -vessel runoff.  Left common femoral patent, profunda patent, SFA occluded at the mid portion with collateralized flow to infrageniculatepopliteal artery. The rest of the popliteal artery patent with 3-vessel runoff   Reviewed previous studies including: favian  Individual images were reviewed.  I agree with the findings  Results were discussed with the patient.    Options have been discussed with the patient including continued medical management vs. proceeding with intraoperative angiogram with runoff and possible angioplasty/atherectomy/stent  With pedal access.  Patient has opted to proceed with this.  Risks have been discussed with the patient including but not limited to MI, death, CVA, bleed, nerve injury, and infection.  He has asked appropriate questions    ASSESSMENT/PLAN:  1. Atheroscler of

## 2025-01-29 NOTE — PROGRESS NOTES
Mulugeta Black (:  1960) is a 64 y.o. male,Established patient, here for evaluation of the following chief complaint(s):  Follow-up (Chart review prior to surgery. )            SUBJECTIVE/OBJECTIVE:  Mulugeta has a history of peripheral vascular disease of the lower extremities.  He has had this for 1 - 5 years. Current treatment includes asa and lipitor.  Mulugeta has not had new wounds. Recently, he reports claudication at a distance of  100 feet.  Mulugeta reports that the left leg is more significant than the right.  He reports claudication is worsened and is mostly in the form of crampy type pain starting in the calves. He has a short recovery time. This is reproducible in nature. He reports ischemic rest pain at night.  He reports walking with cart does not help. He has tried an exercise routine and this has not helped.  He is ready to proceed    I have personally reviewed the following: problem list, current meds, allergies, PMH, PSH, family hx, and social hx  Mulugeta Black is a 64 y.o. 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 (HCC) 2024    Hyperlipidemia 2024    Atherosclerosis of native artery of both lower extremities with intermittent claudication (HCC) 2024     Current Outpatient Medications   Medication Sig Dispense Refill    Multiple Vitamins-Minerals (THERAPEUTIC MULTIVITAMIN-MINERALS) tablet Take 1 tablet by mouth daily      memantine (NAMENDA) 10 MG tablet Take 1 tablet by mouth 2 times daily  5    atorvastatin (LIPITOR) 20 MG tablet Take 1 tablet by mouth daily      EQ ASPIRIN ADULT LOW DOSE 81 MG EC tablet Take 1 tablet by mouth daily      furosemide (LASIX) 20 MG tablet Take 1 tablet by mouth daily as needed (ankle swelling) 30 tablet 5     No current facility-administered medications for this visit.     Allergies: Patient has no

## 2025-01-29 NOTE — H&P
Mulugeta Black (:  1960) is a 64 y.o. male,Established patient, here for evaluation of the following chief complaint(s):  Follow-up (Chart review prior to surgery. )            SUBJECTIVE/OBJECTIVE:  Mulugeta has a history of peripheral vascular disease of the lower extremities.  He has had this for 1 - 5 years. Current treatment includes asa and lipitor.  Mulugeta has not had new wounds. Recently, he reports claudication at a distance of  100 feet.  Mulugeta reports that the left leg is more significant than the right.  He reports claudication is worsened and is mostly in the form of crampy type pain starting in the calves. He has a short recovery time. This is reproducible in nature. He reports ischemic rest pain at night.  He reports walking with cart does not help. He has tried an exercise routine and this has not helped.  He is ready to proceed    I have personally reviewed the following: problem list, current meds, allergies, PMH, PSH, family hx, and social hx  Mulugeta Black is a 64 y.o. 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 (HCC) 2024    Hyperlipidemia 2024    Atherosclerosis of native artery of both lower extremities with intermittent claudication (HCC) 2024     Current Outpatient Medications   Medication Sig Dispense Refill    Multiple Vitamins-Minerals (THERAPEUTIC MULTIVITAMIN-MINERALS) tablet Take 1 tablet by mouth daily      memantine (NAMENDA) 10 MG tablet Take 1 tablet by mouth 2 times daily  5    atorvastatin (LIPITOR) 20 MG tablet Take 1 tablet by mouth daily      EQ ASPIRIN ADULT LOW DOSE 81 MG EC tablet Take 1 tablet by mouth daily      furosemide (LASIX) 20 MG tablet Take 1 tablet by mouth daily as needed (ankle swelling) 30 tablet 5     No current facility-administered medications for this visit.     Allergies: Patient has no

## 2025-02-05 ENCOUNTER — TELEPHONE (OUTPATIENT)
Dept: VASCULAR SURGERY | Age: 65
End: 2025-02-05

## 2025-02-05 NOTE — TELEPHONE ENCOUNTER
Tried calling the patient to let him know that his surgical case has been changed from tomorrow, Thursday, 02/06/2025 to Friday, 02/07/2025.  Left a message to let him know this and ask him to return my phone call to confirm.

## 2025-02-07 ENCOUNTER — APPOINTMENT (OUTPATIENT)
Dept: INTERVENTIONAL RADIOLOGY/VASCULAR | Age: 65
End: 2025-02-07
Attending: SURGERY
Payer: MEDICARE

## 2025-02-07 ENCOUNTER — HOSPITAL ENCOUNTER (OUTPATIENT)
Age: 65
Setting detail: OUTPATIENT SURGERY
Discharge: HOME | End: 2025-02-07
Attending: SURGERY | Admitting: SURGERY
Payer: MEDICARE

## 2025-02-07 ENCOUNTER — ANESTHESIA (OUTPATIENT)
Dept: OPERATING ROOM | Age: 65
End: 2025-02-07
Payer: MEDICARE

## 2025-02-07 ENCOUNTER — ANESTHESIA EVENT (OUTPATIENT)
Dept: OPERATING ROOM | Age: 65
End: 2025-02-07
Payer: MEDICARE

## 2025-02-07 VITALS
DIASTOLIC BLOOD PRESSURE: 77 MMHG | OXYGEN SATURATION: 100 % | WEIGHT: 128 LBS | RESPIRATION RATE: 16 BRPM | SYSTOLIC BLOOD PRESSURE: 127 MMHG | HEART RATE: 72 BPM | BODY MASS INDEX: 18.32 KG/M2 | HEIGHT: 70 IN | TEMPERATURE: 98.3 F

## 2025-02-07 LAB
ABO/RH: NORMAL
ANTIBODY SCREEN: NORMAL

## 2025-02-07 PROCEDURE — C1760 CLOSURE DEV, VASC: HCPCS | Performed by: SURGERY

## 2025-02-07 PROCEDURE — 76937 US GUIDE VASCULAR ACCESS: CPT

## 2025-02-07 PROCEDURE — 86850 RBC ANTIBODY SCREEN: CPT

## 2025-02-07 PROCEDURE — C1887 CATHETER, GUIDING: HCPCS | Performed by: SURGERY

## 2025-02-07 PROCEDURE — 2709999900 HC NON-CHARGEABLE SUPPLY: Performed by: SURGERY

## 2025-02-07 PROCEDURE — C1753 CATH, INTRAVAS ULTRASOUND: HCPCS | Performed by: SURGERY

## 2025-02-07 PROCEDURE — 7100000010 HC PHASE II RECOVERY - FIRST 15 MIN: Performed by: SURGERY

## 2025-02-07 PROCEDURE — 75716 ARTERY X-RAYS ARMS/LEGS: CPT

## 2025-02-07 PROCEDURE — 6360000002 HC RX W HCPCS: Performed by: SURGERY

## 2025-02-07 PROCEDURE — C1769 GUIDE WIRE: HCPCS | Performed by: SURGERY

## 2025-02-07 PROCEDURE — 6360000004 HC RX CONTRAST MEDICATION: Performed by: SURGERY

## 2025-02-07 PROCEDURE — 86900 BLOOD TYPING SEROLOGIC ABO: CPT

## 2025-02-07 PROCEDURE — 2580000003 HC RX 258: Performed by: ANESTHESIOLOGY

## 2025-02-07 PROCEDURE — C1757 CATH, THROMBECTOMY/EMBOLECT: HCPCS | Performed by: SURGERY

## 2025-02-07 PROCEDURE — 2500000003 HC RX 250 WO HCPCS: Performed by: SURGERY

## 2025-02-07 PROCEDURE — 37252 INTRVASC US NONCORONARY 1ST: CPT | Performed by: SURGERY

## 2025-02-07 PROCEDURE — 86901 BLOOD TYPING SEROLOGIC RH(D): CPT

## 2025-02-07 PROCEDURE — 3700000001 HC ADD 15 MINUTES (ANESTHESIA): Performed by: SURGERY

## 2025-02-07 PROCEDURE — 37225 PR REVSC OPN/PRQ FEM/POP W/ATHRC/ANGIOP SM VSL: CPT | Performed by: SURGERY

## 2025-02-07 PROCEDURE — 75625 CONTRAST EXAM ABDOMINL AORTA: CPT

## 2025-02-07 PROCEDURE — 2500000003 HC RX 250 WO HCPCS

## 2025-02-07 PROCEDURE — 7100000000 HC PACU RECOVERY - FIRST 15 MIN: Performed by: SURGERY

## 2025-02-07 PROCEDURE — C1894 INTRO/SHEATH, NON-LASER: HCPCS | Performed by: SURGERY

## 2025-02-07 PROCEDURE — 6370000000 HC RX 637 (ALT 250 FOR IP): Performed by: NURSE PRACTITIONER

## 2025-02-07 PROCEDURE — 75630 X-RAY AORTA LEG ARTERIES: CPT | Performed by: SURGERY

## 2025-02-07 PROCEDURE — 3600000007 HC SURGERY HYBRID BASE: Performed by: SURGERY

## 2025-02-07 PROCEDURE — 3600000017 HC SURGERY HYBRID ADDL 15MIN: Performed by: SURGERY

## 2025-02-07 PROCEDURE — 6360000002 HC RX W HCPCS

## 2025-02-07 PROCEDURE — 37253 INTRVASC US NONCORONARY ADDL: CPT | Performed by: SURGERY

## 2025-02-07 PROCEDURE — C1893 INTRO/SHEATH, FIXED,NON-PEEL: HCPCS | Performed by: SURGERY

## 2025-02-07 PROCEDURE — 36415 COLL VENOUS BLD VENIPUNCTURE: CPT

## 2025-02-07 PROCEDURE — 7100000001 HC PACU RECOVERY - ADDTL 15 MIN: Performed by: SURGERY

## 2025-02-07 PROCEDURE — 3700000000 HC ANESTHESIA ATTENDED CARE: Performed by: SURGERY

## 2025-02-07 PROCEDURE — C2623 CATH, TRANSLUMIN, DRUG-COAT: HCPCS | Performed by: SURGERY

## 2025-02-07 PROCEDURE — 7100000011 HC PHASE II RECOVERY - ADDTL 15 MIN: Performed by: SURGERY

## 2025-02-07 PROCEDURE — C1724 CATH, TRANS ATHEREC,ROTATION: HCPCS | Performed by: SURGERY

## 2025-02-07 RX ORDER — SODIUM CHLORIDE, SODIUM LACTATE, POTASSIUM CHLORIDE, CALCIUM CHLORIDE 600; 310; 30; 20 MG/100ML; MG/100ML; MG/100ML; MG/100ML
INJECTION, SOLUTION INTRAVENOUS CONTINUOUS
Status: DISCONTINUED | OUTPATIENT
Start: 2025-02-07 | End: 2025-02-07 | Stop reason: HOSPADM

## 2025-02-07 RX ORDER — CEFAZOLIN SODIUM 1 G/3ML
INJECTION, POWDER, FOR SOLUTION INTRAMUSCULAR; INTRAVENOUS
Status: DISCONTINUED | OUTPATIENT
Start: 2025-02-07 | End: 2025-02-07 | Stop reason: SDUPTHER

## 2025-02-07 RX ORDER — SODIUM CHLORIDE 9 MG/ML
INJECTION, SOLUTION INTRAVENOUS PRN
Status: DISCONTINUED | OUTPATIENT
Start: 2025-02-07 | End: 2025-02-07 | Stop reason: HOSPADM

## 2025-02-07 RX ORDER — IODIXANOL 320 MG/ML
INJECTION, SOLUTION INTRAVASCULAR PRN
Status: DISCONTINUED | OUTPATIENT
Start: 2025-02-07 | End: 2025-02-07 | Stop reason: ALTCHOICE

## 2025-02-07 RX ORDER — ROCURONIUM BROMIDE 10 MG/ML
INJECTION, SOLUTION INTRAVENOUS
Status: DISCONTINUED | OUTPATIENT
Start: 2025-02-07 | End: 2025-02-07 | Stop reason: SDUPTHER

## 2025-02-07 RX ORDER — HEPARIN SODIUM 1000 [USP'U]/ML
INJECTION, SOLUTION INTRAVENOUS; SUBCUTANEOUS
Status: DISCONTINUED | OUTPATIENT
Start: 2025-02-07 | End: 2025-02-07 | Stop reason: SDUPTHER

## 2025-02-07 RX ORDER — SODIUM CHLORIDE 0.9 % (FLUSH) 0.9 %
5-40 SYRINGE (ML) INJECTION PRN
Status: CANCELLED | OUTPATIENT
Start: 2025-02-07

## 2025-02-07 RX ORDER — SUCCINYLCHOLINE CHLORIDE 20 MG/ML
INJECTION INTRAMUSCULAR; INTRAVENOUS
Status: DISCONTINUED | OUTPATIENT
Start: 2025-02-07 | End: 2025-02-07 | Stop reason: SDUPTHER

## 2025-02-07 RX ORDER — ONDANSETRON 2 MG/ML
INJECTION INTRAMUSCULAR; INTRAVENOUS
Status: DISCONTINUED | OUTPATIENT
Start: 2025-02-07 | End: 2025-02-07 | Stop reason: SDUPTHER

## 2025-02-07 RX ORDER — LIDOCAINE HYDROCHLORIDE 10 MG/ML
INJECTION, SOLUTION INFILTRATION; PERINEURAL
Status: DISCONTINUED | OUTPATIENT
Start: 2025-02-07 | End: 2025-02-07 | Stop reason: SDUPTHER

## 2025-02-07 RX ORDER — SODIUM CHLORIDE 0.9 % (FLUSH) 0.9 %
5-40 SYRINGE (ML) INJECTION EVERY 12 HOURS SCHEDULED
Status: DISCONTINUED | OUTPATIENT
Start: 2025-02-07 | End: 2025-02-07 | Stop reason: HOSPADM

## 2025-02-07 RX ORDER — SODIUM CHLORIDE 0.9 % (FLUSH) 0.9 %
5-40 SYRINGE (ML) INJECTION PRN
Status: DISCONTINUED | OUTPATIENT
Start: 2025-02-07 | End: 2025-02-07 | Stop reason: HOSPADM

## 2025-02-07 RX ORDER — SODIUM CHLORIDE 9 MG/ML
INJECTION, SOLUTION INTRAVENOUS PRN
Status: CANCELLED | OUTPATIENT
Start: 2025-02-07

## 2025-02-07 RX ORDER — FENTANYL CITRATE 50 UG/ML
INJECTION, SOLUTION INTRAMUSCULAR; INTRAVENOUS
Status: DISCONTINUED | OUTPATIENT
Start: 2025-02-07 | End: 2025-02-07 | Stop reason: SDUPTHER

## 2025-02-07 RX ORDER — ASPIRIN 81 MG/1
81 TABLET ORAL ONCE
Status: COMPLETED | OUTPATIENT
Start: 2025-02-07 | End: 2025-02-07

## 2025-02-07 RX ORDER — DEXMEDETOMIDINE HYDROCHLORIDE 100 UG/ML
INJECTION, SOLUTION INTRAVENOUS
Status: DISCONTINUED | OUTPATIENT
Start: 2025-02-07 | End: 2025-02-07 | Stop reason: SDUPTHER

## 2025-02-07 RX ORDER — SODIUM CHLORIDE 0.9 % (FLUSH) 0.9 %
5-40 SYRINGE (ML) INJECTION EVERY 12 HOURS SCHEDULED
Status: CANCELLED | OUTPATIENT
Start: 2025-02-07

## 2025-02-07 RX ORDER — PROPOFOL 10 MG/ML
INJECTION, EMULSION INTRAVENOUS
Status: DISCONTINUED | OUTPATIENT
Start: 2025-02-07 | End: 2025-02-07 | Stop reason: SDUPTHER

## 2025-02-07 RX ORDER — SODIUM CHLORIDE 9 MG/ML
INJECTION, SOLUTION INTRAVENOUS CONTINUOUS
Status: CANCELLED | OUTPATIENT
Start: 2025-02-07

## 2025-02-07 RX ADMIN — CEFAZOLIN 2 G: 1 INJECTION, POWDER, FOR SOLUTION INTRAMUSCULAR; INTRAVENOUS at 11:51

## 2025-02-07 RX ADMIN — HYDROMORPHONE HYDROCHLORIDE 1 MG: 1 INJECTION, SOLUTION INTRAMUSCULAR; INTRAVENOUS; SUBCUTANEOUS at 12:55

## 2025-02-07 RX ADMIN — ONDANSETRON 4 MG: 2 INJECTION INTRAMUSCULAR; INTRAVENOUS at 14:01

## 2025-02-07 RX ADMIN — SUGAMMADEX 200 MG: 100 INJECTION, SOLUTION INTRAVENOUS at 14:09

## 2025-02-07 RX ADMIN — HEPARIN SODIUM 1000 UNITS: 1000 INJECTION, SOLUTION INTRAVENOUS; SUBCUTANEOUS at 13:31

## 2025-02-07 RX ADMIN — SODIUM CHLORIDE, SODIUM LACTATE, POTASSIUM CHLORIDE, AND CALCIUM CHLORIDE: 600; 310; 30; 20 INJECTION, SOLUTION INTRAVENOUS at 08:55

## 2025-02-07 RX ADMIN — ROCURONIUM BROMIDE 45 MG: 10 INJECTION, SOLUTION INTRAVENOUS at 11:50

## 2025-02-07 RX ADMIN — SODIUM CHLORIDE, SODIUM LACTATE, POTASSIUM CHLORIDE, AND CALCIUM CHLORIDE: 600; 310; 30; 20 INJECTION, SOLUTION INTRAVENOUS at 13:29

## 2025-02-07 RX ADMIN — DEXMEDETOMIDINE HYDROCHLORIDE 12 MCG: 100 INJECTION, SOLUTION, CONCENTRATE INTRAVENOUS at 14:21

## 2025-02-07 RX ADMIN — PROPOFOL 100 MG: 10 INJECTION, EMULSION INTRAVENOUS at 11:42

## 2025-02-07 RX ADMIN — ASPIRIN 81 MG: 81 TABLET, COATED ORAL at 09:01

## 2025-02-07 RX ADMIN — HYDROMORPHONE HYDROCHLORIDE 1 MG: 1 INJECTION, SOLUTION INTRAMUSCULAR; INTRAVENOUS; SUBCUTANEOUS at 12:17

## 2025-02-07 RX ADMIN — ROCURONIUM BROMIDE 20 MG: 10 INJECTION, SOLUTION INTRAVENOUS at 13:29

## 2025-02-07 RX ADMIN — DEXMEDETOMIDINE HYDROCHLORIDE 12 MCG: 100 INJECTION, SOLUTION, CONCENTRATE INTRAVENOUS at 14:17

## 2025-02-07 RX ADMIN — SUCCINYLCHOLINE CHLORIDE 100 MG: 20 INJECTION, SOLUTION INTRAMUSCULAR; INTRAVENOUS at 11:42

## 2025-02-07 RX ADMIN — DEXMEDETOMIDINE HYDROCHLORIDE 12 MCG: 100 INJECTION, SOLUTION, CONCENTRATE INTRAVENOUS at 13:27

## 2025-02-07 RX ADMIN — HYDROMORPHONE HYDROCHLORIDE 1 MG: 1 INJECTION, SOLUTION INTRAMUSCULAR; INTRAVENOUS; SUBCUTANEOUS at 13:29

## 2025-02-07 RX ADMIN — HEPARIN SODIUM 5000 UNITS: 1000 INJECTION, SOLUTION INTRAVENOUS; SUBCUTANEOUS at 12:22

## 2025-02-07 RX ADMIN — ROCURONIUM BROMIDE 5 MG: 10 INJECTION, SOLUTION INTRAVENOUS at 11:42

## 2025-02-07 RX ADMIN — FENTANYL CITRATE 100 MCG: 0.05 INJECTION, SOLUTION INTRAMUSCULAR; INTRAVENOUS at 11:37

## 2025-02-07 RX ADMIN — LIDOCAINE HYDROCHLORIDE 50 MG: 10 INJECTION, SOLUTION INFILTRATION; PERINEURAL at 11:42

## 2025-02-07 RX ADMIN — ROCURONIUM BROMIDE 10 MG: 10 INJECTION, SOLUTION INTRAVENOUS at 12:53

## 2025-02-07 ASSESSMENT — PAIN - FUNCTIONAL ASSESSMENT
PAIN_FUNCTIONAL_ASSESSMENT: NONE - DENIES PAIN
PAIN_FUNCTIONAL_ASSESSMENT: NONE - DENIES PAIN
PAIN_FUNCTIONAL_ASSESSMENT: 0-10
PAIN_FUNCTIONAL_ASSESSMENT: NONE - DENIES PAIN

## 2025-02-07 ASSESSMENT — LIFESTYLE VARIABLES: SMOKING_STATUS: 1

## 2025-02-07 NOTE — OP NOTE
Operative Note      Patient: Mulugeta Black  YOB: 1960  MRN: [id number]    Date of Procedure: 2/7/2025    Pre-Op Diagnosis Codes:      * Atherosclerosis of native artery of both lower extremities with intermittent claudication (HCC) [I70.213]    Post-Op Diagnosis: Same       Procedure(s):  AORTOGRAM WITH BILATERAL RUNOFF; JETSTREAM ATHRECTOMY, ANGIOPLASTY OF LEFT SFA, IVUS    Surgeon(s):  Maricarmen Latif DO    Assistant:   * No surgical staff found *    Anesthesia: General    Estimated Blood Loss (mL): less than 100     Complications: None    Specimens:   * No specimens in log *    Implants:  * No implants in log *      Drains:   Urinary Catheter 02/07/25 Desir (Active)       Findings:  Aorta normal caliber.  Bilateral renal arteries are patent.  Bilateral common iliac, internal iliac and external iliac arteries are patent.  Right common femoral artery patent, profunda femoris patent, SFA.  Patent, popliteal artery patent with three-vessel runoff.  Left common femoral artery patent, profunda femoris patent, SFA small caliber with soft plaque, occluded at the midportion with reconstitution to above-knee popliteal artery.  Three-vessel runoff with some proximal plaque.    Detailed Description of Procedure:   Patient was brought to the hybrid operating room and placed in supine position.  He received preoperative antibiotics.  His left leg in its entirety and right leg to the knee were prepped and prepped sterile fashion.  Timeout performed.  Right common femoral artery was accessed under continuous ultrasound guidance using standard micropuncture technique.  5 Kiswahili glide sheath was inserted.  J-wire was floated proximally followed by Omni Flush catheter.  Using power injection aortogram with bilateral lower extremity runoff were performed with mentioned above findings.  Using Omni Flush catheter glide-wire was maneuvered to the left SFA.  We then exchanged to initially 6 x 45 destination sheath,

## 2025-02-07 NOTE — DISCHARGE INSTRUCTIONS
POST ANGIOGRAM INSTRUCTIONS  1.  You should rest until the morning after your procedure, up around the house and to the bathroom only on the day of your procedure.  2.  You must be transported home by a responsible person after your procedure, you cannot drive yourself home.  3.  Do not drive for 72 hours after discharge home.  4.  Drink a 6-8 ounce glass of non-alcoholic liquid every 2 hours until bedtime on the day of the procedure to help flush the contrast used (you will make more urine) .  5.  Check the puncture site after each activity for bleeding or swelling.    6.  If bleeding occurs, apply pressure to site and call 911 or rush to the nearest emergency room (do NOT drive yourself!).  7.  Resume normal non-strenuous activity 72 hours after discharge home.  8.  Bruising and soreness at the puncture site may occur.  This will heal and clear after several weeks.    9.  Keep your leg (with puncture site) mostly straight while sitting or lying for the first 24 hours.    10. No heavy lifting or straining (more than 10 pounds) for 72 hours after the procedure.  11.  Keep the bandage over the puncture site for 24 hours after discharge home.  You may remove the bandage and shower after 24 hours.  12.  Once a day for the next 3 days, look at the puncture site, call physician if you    notice:  * red and/or hot at the puncture site  * bloody drainage, foul-smelling, yellowish or greenish drainage from the puncture site  *a very large bruise under/arond the puncture site (often firm to touch)numbness, tingling in foot/leg/or loss of motion/sensation in foot or leg  *itching/hives on any part of your body; or nausea/vomiting after receiving the dye

## 2025-02-07 NOTE — ANESTHESIA PRE PROCEDURE
Department of Anesthesiology  Preprocedure Note       Name:  Mulugeta Black   Age:  64 y.o.  :  1960                                          MRN:  [id number]         Date:  2025      Surgeon: Surgeon(s):  Maricarmen Latif DO    Procedure: Procedure(s):  INTRAOPERATIVE ANGIOGRAM PEDAL ACCESS    Medications prior to admission:   Prior to Admission medications    Medication Sig Start Date End Date Taking? Authorizing Provider   Multiple Vitamins-Minerals (THERAPEUTIC MULTIVITAMIN-MINERALS) tablet Take 1 tablet by mouth daily   Yes ProviderAb MD   memantine (NAMENDA) 10 MG tablet Take 1 tablet by mouth 2 times daily   Yes Fredrick Whitmore MD   atorvastatin (LIPITOR) 20 MG tablet Take 1 tablet by mouth daily 24  Yes ProviderAb MD EQ ASPIRIN ADULT LOW DOSE 81 MG EC tablet Take 1 tablet by mouth daily 24  Yes ProviderAb MD   furosemide (LASIX) 20 MG tablet Take 1 tablet by mouth daily as needed (ankle swelling) 24  Yes Vangie Escobedo APRN - NP       Current medications:    Current Facility-Administered Medications   Medication Dose Route Frequency Provider Last Rate Last Admin    lactated ringers infusion   IntraVENous Continuous Sachin Beltrán  mL/hr at 25 0855 New Bag at 25 0855    sodium chloride flush 0.9 % injection 5-40 mL  5-40 mL IntraVENous 2 times per day Olga Carlos, CHANEL        sodium chloride flush 0.9 % injection 5-40 mL  5-40 mL IntraVENous PRN Olga Carlos APRN        0.9 % sodium chloride infusion   IntraVENous PRN Olga Carlos APRN        ceFAZolin (ANCEF) 2,000 mg in sterile water 20 mL IV syringe  2,000 mg IntraVENous On Call to OR Olga Carlos APRN        aspirin EC tablet 81 mg  81 mg Oral Once Olga Carlos APRN           Allergies:  No Known Allergies    Problem List:    Patient Active Problem List   Diagnosis Code    Hyperlipidemia E78.5    Atherosclerosis of native artery of both lower

## 2025-02-07 NOTE — PROGRESS NOTES
RIGHT GROIN DRESSING REMAINS C/D/I, NO EVIDENCE OF BLEEDING/HEMATOMA NOTED, CAPILLARY REFILL BILATERAL FEET REMAINS LESS THAN 3 SECONDS, SKIN WARM AND DRY

## 2025-02-07 NOTE — INTERVAL H&P NOTE
Update History & Physical    The patient's History and Physical of January 29, 2025 was reviewed with the patient and I examined the patient. There was no change. The surgical site was confirmed by the patient and me.     Plan: The risks, benefits, expected outcome, and alternative to the recommended procedure have been discussed with the patient. Patient understands and wants to proceed with the procedure.     Electronically signed by Maricarmen Latif DO on 2/7/2025 at 11:24 AM

## 2025-02-07 NOTE — PROGRESS NOTES
PATIENT AND BROTHER AWARE THAT PATIENT NEEDS TO VOID BY 1:30 AM OR CALL THE DOCTOR OR GO TO NEAREST EMERGENCY ROOM OR AT ANY TIME PRIOR THAT IF HE FEELS BLADDER IS FULL AND HE NEEDS TO VOID AND IS UNABLE TO DO SO.

## 2025-02-07 NOTE — PROGRESS NOTES
LEFT GROIN MYNX DRESSING SITE REMAINS C/D/I, NO EVIDENCE OF BLEEDING/HEMATOMA NOTED, CAPILLARY REFILL TO BILATERAL FEET ARE LESS THAN 3 SECONDS, DOPPLER R/L PEDAL AND DORSALIS PULSES

## 2025-02-07 NOTE — ANESTHESIA POSTPROCEDURE EVALUATION
Department of Anesthesiology  Postprocedure Note    Patient: Mulugeta Black  MRN: [id number]  YOB: 1960  Date of evaluation: 2/7/2025    Procedure Summary       Date: 02/07/25 Room / Location: [facility] OR [address]    Anesthesia Start: 1137 Anesthesia Stop:     Procedure: AORTOGRAM WITH BILATERAL RUNOFF; JETSTREAM ATHRECTOMY, ANGIOPLASTY OF LEFT SFA (Bilateral) Diagnosis:       Atherosclerosis of native artery of both lower extremities with intermittent claudication (HCC)      (Atherosclerosis of native artery of both lower extremities with intermittent claudication (HCC) [I70.213])    Surgeons: Maricarmen Latif DO Responsible Provider: Sachin Green APRN - CRNA    Anesthesia Type: General ASA Status: 3            Anesthesia Type: General    Galindo Phase I: Galindo Score: 10    Galindo Phase II:      Anesthesia Post Evaluation    Patient location during evaluation: PACU  Patient participation: complete - patient participated  Level of consciousness: awake and alert  Pain score: 0  Airway patency: patent  Nausea & Vomiting: no vomiting and no nausea  Cardiovascular status: blood pressure returned to baseline and hemodynamically stable  Respiratory status: spontaneous ventilation, room air, nonlabored ventilation and acceptable  Hydration status: euvolemic  Comments: BP (!) 156/95   Pulse 91   Temp (!) 96.7 °F (35.9 °C) (Temporal)   Resp 16   Ht 1.778 m (5' 10\")   Wt 58.1 kg (128 lb)   SpO2 96%   BMI 18.37 kg/m²     Pain management: adequate    No notable events documented.

## 2025-02-07 NOTE — PROGRESS NOTES
ARU PSYCHOLOGICAL SCREENING    Assessment Initiated:  July 10, 2019    Rehab Diagnosis:  Left Femur Fx    Pertinent Physical/Psychiatric History:     Patient Active Problem List   Diagnosis Code    Groin pain, right R10.31    Sepsis due to Pseudomonas species ([facility] A41.52    Demand ischemia of myocardium ([facility] I24.8    Cardiac pacemaker in situ Z95.0    Paroxysmal atrial fibrillation (HCC) I48.0    History of total left hip arthroplasty Z96.642    Chronic subdural hematoma (HCC) I62.03    Mild aortic stenosis I35.0    Periprosthetic fracture around internal prosthetic left hip joint ([facility] M97. 0XA    Displaced subtrochanteric fracture of left femur ([facility] S72.22XA    Status post open reduction with internal fixation of fracture Z96.7, Z87.81    Impaired mobility and ADLs Z74.09    Acute blood loss as cause of postoperative anemia D62    Acute pulmonary embolism (HCC) I26.99    Anticoagulated on warfarin Z79.01    Primary osteoarthritis involving multiple joints M15.0    Gastroesophageal reflux disease K21.9    Dyslipidemia E78.5    Diverticulosis K57.90    History of deep vein thrombosis of lower extremity Z86.718    Sick sinus syndrome (HCC) I49.5    Benign prostatic hyperplasia with lower urinary tract symptoms N40.1    Tremor R25.1    Obesity, Class I, BMI 30-34.9 E66.9    Vitamin D deficiency E55.9    Essential tremor G25.0    History of pericarditis Z86.79       Patient denies history of psychiatric services and is not requiring psychotropic medication on admit to ARU. Patient denies history of any substance abuse nor dependence in his history. OBJECTIVE  GENERAL OBSERVATIONS  Willingness to participate in program: [x] good   [] fair [] indifferent [] poor    General Appearance:  Patient observed casually and appropriately dressed and groomed for ARU and sitting upright in wheelchair, not in any distress.     Sensory Impairments:  Patient has satisfactory auditory reception and RIGHT GROIN DRESSING REMAINS C/D/I, NO EVIDENCE OF BLEEDING/HEMATOMA NOTED, PATTY CAPILLARY REFILL REMAINS LESS THAN 3 SECONDS TO PATTY FEET, SKIN P/W/D   comprehension and responds to all inquiry with intelligibility. He is aware of NWB status. Islam Affiliation:  Restorationism    Admission Assessment  Discharge Status   [x] alert  [] lethargic  [] difficult to arouse  [] fluctuating  [] other: Level of Consciousness [x] alert  [] lethargic  [] difficult to arouse  [] fluctuating  [] other:   [x] person  [x] place  [x] time  [x] situation Oriented [x] person  [x] place  [x] time  [x] situation   [x] within normal limits  [] impaired       [] mild        [] moderate        [] severe Attention [x] within normal limits  [] impaired       [] mild        [] moderate        [] severe   [x] within normal limits  [] impaired       [] mild        [] moderate        [] severe Memory [x] within normal limits  [] impaired       [] mild        [] moderate        [] severe   [x] appropriate to situation  [] depressed  [] anxious  [] angry   [] fearful  [] emotionally labile  [] other:  Mood [x] appropriate to situation  [] depressed  [x] anxious  [] angry   [] fearful  [] emotionally labile  [] other: Expressed anxiety about extraneous, personal issues with sale of home and not related directly to current circumstance, but causing consternation.    [x] appropriate  [] flat  [] inappropriate to content of speech Affect [x] appropriate  [] flat  [] inappropriate to content of speech   [x] appropriate  [] aggressive/agitated  [] withdrawn  [] inappropriate  [] other: Behavior [x] appropriate  [] aggressive/agitated  [] withdrawn  [] inappropriate  [] other:   [] good  [x] limited  [] denial  [] none Insight Into Illness [x] good  [] limited  [] denial  [] none   [x] intact  [] impaired       [] mild        [] moderate        [] severe       Describe:  Cognition [x] intact  [] impaired       [] mild        [] moderate        [] severe       Describe:    [x] coping  [] demonstrates poor adjustment  [] undetermined       As evidenced by:  Patient Adjustment to Disability [x] coping  [] demonstrates poor adjustment  [] undetermined       As evidenced by:    [x] coping  [] demonstrates poor adjustment  [] undetermined      As evidenced by: Patient appears to have excellent spousal support. Family Adjustment to Disability [x] coping  [] demonstrates poor adjustment  [] undetermined      As evidenced by: Excellent spousal support continued throughout hospitalization. ASSESSMENT  Clinical Impression:  Patient is a very pleasant and cooperative, 81year old, , retired ( for the [employer] [place] and [place]),  male. He and spouse reside in [place] in their daughter's two story residence; but, they are able to remain on first floor. Patient is very capable of describing his multiple orthopedic surgeries through the years and circumstances of current injury, and need for \"repair. \"  In fact, the same left hip was replaced only earlier this year. Patient is very insightful about his status and seems realistic in his assessment of what his recovery might be, at least for the near term. He is hopeful that he will regain full functional capability and is motivated to participate in treatment on ARU. Interestingly, both patient and spouse are familiar with ARU milieu and modalities from many years ago when extended family were inpatient on ARU. Emotionally, patient presents as calm and composed. He denies any feelings of acute emotional distress and no lability is observed. He further denies history of psychiatric services and is not requiring psychotropic medication for mood nor behavior stability on his admit to ARU. However, patient does acknowledge underlying distress with unexpected injury and need for further surgery, as well as his now, increased dependent state in recovery. He will be monitored for any emotional and or behavioral difficulties that might arise on ARU and he will be encouraged to persevere.     Cognitively, patient is entirely alert and oriented. He presents with no evidence of any significant decline in cognitive function; and, in fact, he presents as highly verbal and intelligent and able to explain himself and discuss pertinent issues at a high level. Patient will certainly benefit from cues, prompts and redirection in therapy, especially given his NWB status; however, he comprehends the necessity for same and appears to be wholly cooperative. Patient Strengths:  Alert, oriented, pleasant, cooperative and motivated to improve his status and regain functional capability. Patient Preferences:  Patient expects to return to home post ARU. Rehab Potential:  Excellent    Educational Needs: Under each heading list the specific items in which the patient or family will need education/training. Example: hip precautions, use of walker, ADL equipment, neglect, judgment, adjustment, etc.     Special considerations or accommodations for teaching:  [x] Yes     [] No     [] NA  If Yes, explain: Admits to underlying distress feelings with injury  Discharge Status    Completed Demonstrated/ Verbalized Understanding    Yes No Yes No   Info regarding disability:  [] [] [] []   Adjustment: Forced dependency [x] [] [x] []   Cognition:  [] [] [] []   Other: Underlying distress [x] [] [x] []   Other: Decreased self esteem [x] [] [x] []   If education not completed, explain: [] [] [] []     PLAN  Problem:  Forced dependency and NWB status post injury/surgery  Long Term Goal: Accept forced dependency in recovery and pace self accordingly  Intervention: Patient education and support   At Discharge  LTG Achieved: [x] Yes [] No If not achieved, explain:    Problem: Situational stress with recovery  Long Term Goal: Minimize stress in recovery  Intervention: Support  and patient education  At Discharge  LTG Achieved: [x] Yes [] No If not achieved, explain:    Problem: Decreased self esteem and confidence  Long Term Goal: Maximize self esteem and confidence  Intervention: Positive reinforcement and support   At Discharge  LTG Achieved: [x] Yes [] No If not achieved, explain:    Problem:   Long Term Goal:   Intervention:   At Discharge  LTG Achieved: [] Yes [] No If not achieved, explain:    Problem:   Long Term Goal:   Intervention:   At Discharge  LTG Achieved: [] Yes [] No If not achieved, explain:    Wyline Closs, THE [facility]  7/11/2019 9:30 AM    DISCHARGE STATUS    Clinical Impressions: Patient familiar with treatment milieu on ARU and therefore had relatively easy integration. Patient not experiencing significant emotional disturbance nor having any thought disturbance; but, he has nonetheless been stressed by various issues related to extended history of orthopedic problems, and now another intervention and recovery. Patient obviously has good capacity, however, to maintain his focus of concentration and attention when necessary and his problem solving, reasoning, and participation have all been very adequate for therapy purposes. Patient and spouse opted to discharge to [facility] in [place] in order to accommodate their short term needs; and, in fact, they are very supportive of one another in their joint decision-making.     Follow-up Services Recommended Purpose                 Wyline Closs, PHD  Discharge Date/Time:

## 2025-02-08 NOTE — PROGRESS NOTES
PATIENT DRESSED AND WALKING IN ROOM AND HALLWAY, SITE REMAINS UNCHANGED TO RIGHT GROIN DRESSING - C/D/I, NO EVIDENCE OF BLEEDING/HEMATOMA NOTED

## 2025-02-13 NOTE — PROGRESS NOTES
Mulugeta Black (:  1960) is a 64 y.o. male,Established patient, here for evaluation of the following chief complaint(s):  Follow-up (Follow up intraoperative angiogram with CHE's.)            SUBJECTIVE/OBJECTIVE:  Patient presents for follow up after an arteriogram with JETSTREAM ATHRECTOMY, ANGIOPLASTY OF LEFT SFA, IVUS  done 2 weeks ago.  Procedure was done for peripheral vascular disease with claudication. Post op problems include none.  Angiogram complications were none.  Post op pain and swelling are minimal.  At present, he reports claudication at a distance of  which varies.  Mulugeta reports that the left leg is more significant than the right.  He reports claudication is improved and is mostly in the form of crampy type pain starting in the calves. He has a short recovery time. This is reproducible in nature. He reports ischemic rest pain 0 times per night.  He reports walking with cart does not help.      I have personally reviewed the following: problem list, current meds, allergies, PMH, PSH, family hx, and social hx  Mulugeta Black is a 64 y.o. 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) 2024    Hyperlipidemia 2024    Atherosclerosis of native artery of both lower extremities with intermittent claudication 2024     Current Outpatient Medications   Medication Sig Dispense Refill    Multiple Vitamins-Minerals (THERAPEUTIC MULTIVITAMIN-MINERALS) tablet Take 1 tablet by mouth daily      memantine (NAMENDA) 10 MG tablet Take 1 tablet by mouth 2 times daily  5    atorvastatin (LIPITOR) 20 MG tablet Take 1 tablet by mouth daily      EQ ASPIRIN ADULT LOW DOSE 81 MG EC tablet Take 1 tablet by mouth daily      furosemide (LASIX) 20 MG tablet Take 1 tablet by mouth daily as needed (ankle swelling) 30 tablet 5     No current facility-administered

## 2025-02-25 ENCOUNTER — HOSPITAL ENCOUNTER (OUTPATIENT)
Dept: VASCULAR LAB | Age: 65
Discharge: HOME | End: 2025-02-27
Payer: MEDICARE

## 2025-02-25 ENCOUNTER — OFFICE VISIT (OUTPATIENT)
Dept: VASCULAR SURGERY | Age: 65
End: 2025-02-25
Payer: MEDICARE

## 2025-02-25 VITALS
DIASTOLIC BLOOD PRESSURE: 64 MMHG | HEART RATE: 99 BPM | OXYGEN SATURATION: 99 % | SYSTOLIC BLOOD PRESSURE: 140 MMHG | TEMPERATURE: 96.8 F

## 2025-02-25 DIAGNOSIS — I70.213 ATHEROSCLER OF NATIVE ARTERY OF BOTH LEGS WITH INTERMIT CLAUDICATION: Primary | ICD-10-CM

## 2025-02-25 DIAGNOSIS — I70.213 ATHEROSCLER OF NATIVE ARTERY OF BOTH LEGS WITH INTERMIT CLAUDICATION: ICD-10-CM

## 2025-02-25 PROCEDURE — 3017F COLORECTAL CA SCREEN DOC REV: CPT | Performed by: NURSE PRACTITIONER

## 2025-02-25 PROCEDURE — G8427 DOCREV CUR MEDS BY ELIG CLIN: HCPCS | Performed by: NURSE PRACTITIONER

## 2025-02-25 PROCEDURE — G8419 CALC BMI OUT NRM PARAM NOF/U: HCPCS | Performed by: NURSE PRACTITIONER

## 2025-02-25 PROCEDURE — 93922 UPR/L XTREMITY ART 2 LEVELS: CPT

## 2025-02-25 PROCEDURE — 4004F PT TOBACCO SCREEN RCVD TLK: CPT | Performed by: NURSE PRACTITIONER

## 2025-02-25 PROCEDURE — 99214 OFFICE O/P EST MOD 30 MIN: CPT | Performed by: NURSE PRACTITIONER

## 2025-02-26 ENCOUNTER — TELEPHONE (OUTPATIENT)
Dept: VASCULAR SURGERY | Age: 65
End: 2025-02-26

## 2025-02-26 DIAGNOSIS — I70.213 ATHEROSCLER OF NATIVE ARTERY OF BOTH LEGS WITH INTERMIT CLAUDICATION: Primary | ICD-10-CM

## 2025-02-26 NOTE — TELEPHONE ENCOUNTER
Called and spoke to the patient's brother to let him know the following.  I let him know we would mail him an appointment card for the 3 month vascular testing and following.  The patient's brother voiced understanding.                   ----- Message from CHANEL Orellana sent at 2/26/2025  5:31 AM CST -----  Please let him and his brother know I Talked with Dr. Latif.  She said since he is doing better we should wait and watch this.  We will get repeat ascan and phillip in 3 months.  If he starts to have leg pain prior to this, call and we will get him in sooner

## 2025-02-27 LAB
VAS LEFT ABI: 0.52
VAS LEFT ARM BP: 157 MMHG
VAS LEFT DORSALIS PEDIS BP: 81 MMHG
VAS LEFT PTA BP: 78 MMHG
VAS LEFT TBI: 0.19
VAS LEFT TOE PRESSURE: 30 MMHG
VAS RIGHT ABI: 0.96
VAS RIGHT ARM BP: 156 MMHG
VAS RIGHT DORSALIS PEDIS BP: 150 MMHG
VAS RIGHT PTA BP: 136 MMHG
VAS RIGHT TBI: 0.69
VAS RIGHT TOE PRESSURE: 109 MMHG

## 2025-03-07 ENCOUNTER — OFFICE VISIT (OUTPATIENT)
Dept: NEUROLOGY | Age: 65
End: 2025-03-07
Payer: MEDICARE

## 2025-03-07 VITALS
OXYGEN SATURATION: 91 % | HEART RATE: 87 BPM | DIASTOLIC BLOOD PRESSURE: 80 MMHG | HEIGHT: 70 IN | WEIGHT: 128 LBS | RESPIRATION RATE: 16 BRPM | SYSTOLIC BLOOD PRESSURE: 124 MMHG | BODY MASS INDEX: 18.32 KG/M2

## 2025-03-07 DIAGNOSIS — G25.0 ESSENTIAL TREMOR: ICD-10-CM

## 2025-03-07 DIAGNOSIS — F10.11 HISTORY OF ALCOHOL ABUSE: ICD-10-CM

## 2025-03-07 DIAGNOSIS — R41.3 MEMORY LOSS: Primary | ICD-10-CM

## 2025-03-07 DIAGNOSIS — R93.0 ABNORMAL MRI OF HEAD: ICD-10-CM

## 2025-03-07 PROCEDURE — 99214 OFFICE O/P EST MOD 30 MIN: CPT | Performed by: PSYCHIATRY & NEUROLOGY

## 2025-03-07 PROCEDURE — G8427 DOCREV CUR MEDS BY ELIG CLIN: HCPCS | Performed by: PSYCHIATRY & NEUROLOGY

## 2025-03-07 PROCEDURE — 4004F PT TOBACCO SCREEN RCVD TLK: CPT | Performed by: PSYCHIATRY & NEUROLOGY

## 2025-03-07 PROCEDURE — 3017F COLORECTAL CA SCREEN DOC REV: CPT | Performed by: PSYCHIATRY & NEUROLOGY

## 2025-03-07 PROCEDURE — G8419 CALC BMI OUT NRM PARAM NOF/U: HCPCS | Performed by: PSYCHIATRY & NEUROLOGY

## 2025-03-07 RX ORDER — DIVALPROEX SODIUM 500 MG/1
500 TABLET, DELAYED RELEASE ORAL 2 TIMES DAILY
Qty: 30 TABLET | Refills: 5 | Status: SHIPPED | OUTPATIENT
Start: 2025-03-07

## 2025-03-07 NOTE — PROGRESS NOTES
Chief Complaint   Patient presents with    Memory Loss     Patient is here for memory loss, it has gotten worse        Mulugeta Black is a 64 y.o. year old male who is seen for evaluation of dementia.  He is here today with his brother.  He states he has been on disability for several years for his dementia.  He has a past history of heavy alcohol abuse.  The patient currently lives by himself and does his own ADLs but does not drive.    Is unclear if he has been on any medication for dementia in the past or what his workup may have been..  He appears to have some word finding difficulties at times.  Last year Namenda was added.  Doing about the same.  B12 level came back normal.  MRI showing prominent diffuse atrophy with minimal white matter change and encephalomalacia in the right parietal lobe.  When seen here 12/24 Aricept was added. This was stopped as he worsened on it.  He has had surgery for vascular claudication since last seen here.  His brother says he has worsened since here last.  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 Latif    VASCULAR SURGERY Bilateral 2/7/2025    AORTOGRAM WITH BILATERAL RUNOFF; JETSTREAM ATHRECTOMY, ANGIOPLASTY OF LEFT SFA performed by Maricarmen Latif DO at [facility] OR       Family History   Problem Relation Age of Onset

## 2025-04-22 RX ORDER — MEMANTINE HYDROCHLORIDE 10 MG/1
10 TABLET ORAL 2 TIMES DAILY
Qty: 180 TABLET | Refills: 3 | Status: SHIPPED | OUTPATIENT
Start: 2025-04-22

## 2025-04-22 NOTE — TELEPHONE ENCOUNTER
Requested Prescriptions     Pending Prescriptions Disp Refills    memantine (NAMENDA) 10 MG tablet [Pharmacy Med Name: Memantine HCl 10 MG Oral Tablet] 180 tablet 0     Sig: Take 1 tablet by mouth twice daily       Last Office Visit: 3/7/2025  Next Office Visit: 6/13/2025  Last Medication Refill:3/15/2025    Patient of dr Whitmore he is out on brea

## 2025-08-14 ENCOUNTER — HOSPITAL ENCOUNTER (OUTPATIENT)
Dept: VASCULAR LAB | Age: 65
Discharge: HOME | End: 2025-08-14
Payer: MEDICARE

## 2025-08-14 ENCOUNTER — HOSPITAL ENCOUNTER (OUTPATIENT)
Dept: VASCULAR LAB | Age: 65
End: 2025-08-14
Payer: MEDICARE

## 2025-08-14 ENCOUNTER — OFFICE VISIT (OUTPATIENT)
Dept: VASCULAR SURGERY | Age: 65
End: 2025-08-14
Payer: MEDICARE

## 2025-08-14 VITALS
SYSTOLIC BLOOD PRESSURE: 130 MMHG | WEIGHT: 120 LBS | OXYGEN SATURATION: 100 % | DIASTOLIC BLOOD PRESSURE: 80 MMHG | HEART RATE: 55 BPM | HEIGHT: 70 IN | BODY MASS INDEX: 17.18 KG/M2 | TEMPERATURE: 97.9 F

## 2025-08-14 DIAGNOSIS — I70.213 ATHEROSCLER OF NATIVE ARTERY OF BOTH LEGS WITH INTERMIT CLAUDICATION: Primary | ICD-10-CM

## 2025-08-14 DIAGNOSIS — I70.213 ATHEROSCLER OF NATIVE ARTERY OF BOTH LEGS WITH INTERMIT CLAUDICATION: ICD-10-CM

## 2025-08-14 PROCEDURE — 93925 LOWER EXTREMITY STUDY: CPT

## 2025-08-14 PROCEDURE — 93922 UPR/L XTREMITY ART 2 LEVELS: CPT

## 2025-08-14 PROCEDURE — G8427 DOCREV CUR MEDS BY ELIG CLIN: HCPCS | Performed by: NURSE PRACTITIONER

## 2025-08-14 PROCEDURE — 4004F PT TOBACCO SCREEN RCVD TLK: CPT | Performed by: NURSE PRACTITIONER

## 2025-08-14 PROCEDURE — 99214 OFFICE O/P EST MOD 30 MIN: CPT | Performed by: NURSE PRACTITIONER

## 2025-08-14 PROCEDURE — G8419 CALC BMI OUT NRM PARAM NOF/U: HCPCS | Performed by: NURSE PRACTITIONER

## 2025-08-14 PROCEDURE — 3017F COLORECTAL CA SCREEN DOC REV: CPT | Performed by: NURSE PRACTITIONER

## 2025-08-16 LAB
VAS LEFT ABI: 0.57
VAS LEFT ARM BP: 155 MMHG
VAS LEFT ATA PROX PSV: 55.3 CM/S
VAS LEFT CFA PROX PSV: 162 CM/S
VAS LEFT DORSALIS PEDIS BP: 87 MMHG
VAS LEFT PERONEAL PROX PSV: 32 CM/S
VAS LEFT PFA PROX PSV: 155 CM/S
VAS LEFT POP A PROX PSV: 0 CM/S
VAS LEFT PTA BP: 88 MMHG
VAS LEFT PTA PROX PSV: 37.3 CM/S
VAS LEFT SFA DIST PSV: 0 CM/S
VAS LEFT SFA MID PSV: 0 CM/S
VAS LEFT SFA PROX PSV: 0 CM/S
VAS LEFT SFA PROX VEL RATIO: 0
VAS LEFT TBI: 0.23
VAS LEFT TOE PRESSURE: 36 MMHG
VAS RIGHT ABI: 1.01
VAS RIGHT ARM BP: 144 MMHG
VAS RIGHT ATA PROX PSV: 106 CM/S
VAS RIGHT CFA PROX PSV: 467 CM/S
VAS RIGHT DORSALIS PEDIS BP: 157 MMHG
VAS RIGHT PERONEAL PROX PSV: 62 CM/S
VAS RIGHT PFA PROX PSV: 336 CM/S
VAS RIGHT POP A DIST PSV: 57 CM/S
VAS RIGHT POP A PROX PSV: 86.4 CM/S
VAS RIGHT POP A PROX VEL RATIO: 0.48
VAS RIGHT PTA BP: 149 MMHG
VAS RIGHT PTA PROX PSV: 33.3 CM/S
VAS RIGHT SFA DIST PSV: 181 CM/S
VAS RIGHT SFA DIST VEL RATIO: 1.45
VAS RIGHT SFA MID PSV: 125 CM/S
VAS RIGHT SFA MID VEL RATIO: 1
VAS RIGHT SFA PROX PSV: 127 CM/S
VAS RIGHT SFA PROX VEL RATIO: 0.3
VAS RIGHT TBI: 0.78
VAS RIGHT TOE PRESSURE: 121 MMHG

## (undated) DEVICE — TOWEL,OR,DSP,ST,BLUE,DLX,4/PK,20PK/CS: Brand: MEDLINE

## (undated) DEVICE — DESTINATION RENAL GUIDING SHEATH: Brand: DESTINATION

## (undated) DEVICE — LUBRICANT ATHERECTOMY CATHETER VIPERSLIDE 100ML BAG F/ATHERECTOMY

## (undated) DEVICE — DRAPE SHEET: Brand: UNBRANDED

## (undated) DEVICE — BLANKET WRM W29.9XL79.1IN UP BODY FORC AIR MISTRAL-AIR

## (undated) DEVICE — PINNACLE INTRODUCER SHEATH: Brand: PINNACLE

## (undated) DEVICE — CATHETER ASPIR 6FR L140CM GWIRE 0.014IN OPTIMIZED TIP DSGN

## (undated) DEVICE — PROVE COVER: Brand: UNBRANDED

## (undated) DEVICE — PACK,UNIVERSAL,NO GOWNS: Brand: MEDLINE

## (undated) DEVICE — CATHETER ATHRCTMY 7FR L135CM GWIRE 0.014IN TIP DIA2.1-3MM

## (undated) DEVICE — Device: Brand: VISIONS PV .014P RX DIGITAL IVUS CATHETER

## (undated) DEVICE — GUIDEWIRE VASC L300CM DIA0.014IN STR SHT TAPR TIP SIL S STL

## (undated) DEVICE — CATHETER PTCA L130CM BLLN L60MM DIA5MM SHTH 5FR GWIRE

## (undated) DEVICE — SYRINGE KIT,PACKAGED,,150FT,MK 7(ANGIO-ARTERION, 150ML SYR KIT W/QFT,MC)(60729385): Brand: MEDRAD® MARK 7 ARTERION DISPOSABLE SYRINGE 150 ML WITH QUICK FILL TUBE

## (undated) DEVICE — GUIDEWIRE WITH ICE™ HYDROPHILIC COATING: Brand: V-18™ CONTROL WIRE™

## (undated) DEVICE — Device

## (undated) DEVICE — DRESSING TRNSPAR W5XL4.5IN FLM SHT SEMIPERMEABLE WIND

## (undated) DEVICE — NITROGLYCERIN 0.2 MG/ML IN D5W

## (undated) DEVICE — SOLUTION IV 500ML 0.9% SOD CHL PH 5 INJ USP VIAFLX PLAS

## (undated) DEVICE — CATHETER KIT 5 FR 21 GAX7 CM MICROINTRODUCER GUIDEWIRE STIFF

## (undated) DEVICE — SOLUTION IV 250ML 0.9% SOD CHL PH 5 INJ USP VIAFLX PLAS

## (undated) DEVICE — TUBING ANGIO L72IN 900PSI CLR PVC M TO FEM AIRLESS ROT ADPT

## (undated) DEVICE — ROYAL SILK SURGICAL GOWN, XXL: Brand: CONVERTORS

## (undated) DEVICE — DRAPE C ARM W42XL120IN W POLY STRP W OUT LENS

## (undated) DEVICE — NG KIT, 21 GA, 10 PACK: Brand: SITE-RITE

## (undated) DEVICE — HANDLE LT FOR NLC C SECT RM ST/5

## (undated) DEVICE — DRAPE,UTILITY,XL,4/PK,STERILE: Brand: MEDLINE

## (undated) DEVICE — NAVICROSS SUPPORT CATHETER: Brand: NAVICROSS

## (undated) DEVICE — INFLATION DEVICE: Brand: ENCORE™ 26

## (undated) DEVICE — GLOW 'N TELL 30CM TAPE (50 STRIPS): Brand: VASCUTAPE RADIOPAQUE TAPE

## (undated) DEVICE — GOWN, ORBIS, XLARGE, STERILE: Brand: MEDLINE

## (undated) DEVICE — CATHETER ANGIO AD 5FR L65CM DIA0.049IN GWIRE 0.035IN SHEP

## (undated) DEVICE — GUIDEWIRE VASC J 3 MM 0.035 INX260 CM 10 CM PTFE SS STRT

## (undated) DEVICE — 1LYRTR 16FR10ML100%SIL UMS SNP: Brand: MEDLINE INDUSTRIES, INC.

## (undated) DEVICE — SYRINGE 20ML LL S/C 50

## (undated) DEVICE — RADIFOCUS GLIDEWIRE ADVANTAGE GUIDEWIRE: Brand: GLIDEWIRE ADVANTAGE

## (undated) DEVICE — GUIDEWIRE WITH ICE™ HYDROPHILIC COATING: Brand: V-14™ CONTROL WIRE™

## (undated) DEVICE — SOLUTION IV 1000ML 0.9% SOD CHL PH 5 INJ USP VIAFLX PLAS

## (undated) DEVICE — PENCIL BTTN S S CAUT TIP W HOLSTER 25 50

## (undated) DEVICE — GLOVE SURG SZ 7 CRM LTX FREE POLYISOPRENE POLYMER BEAD ANTI

## (undated) DEVICE — GLIDESHEATH BASIC HYDROPHILIC COATED INTRODUCER SHEATH: Brand: GLIDESHEATH